# Patient Record
Sex: FEMALE | Race: WHITE | NOT HISPANIC OR LATINO | Employment: OTHER | URBAN - METROPOLITAN AREA
[De-identification: names, ages, dates, MRNs, and addresses within clinical notes are randomized per-mention and may not be internally consistent; named-entity substitution may affect disease eponyms.]

---

## 2023-02-08 PROBLEM — G47.30 MILD SLEEP APNEA: Status: ACTIVE | Noted: 2023-02-08

## 2023-02-08 PROBLEM — E55.9 VITAMIN D DEFICIENCY: Status: ACTIVE | Noted: 2023-02-08

## 2023-02-08 PROBLEM — E78.5 HYPERLIPIDEMIA: Status: ACTIVE | Noted: 2023-02-08

## 2023-02-08 PROBLEM — E11.65 TYPE 2 DIABETES MELLITUS WITH HYPERGLYCEMIA, WITHOUT LONG-TERM CURRENT USE OF INSULIN (MULTI): Status: ACTIVE | Noted: 2023-02-08

## 2023-02-08 PROBLEM — F33.1 MODERATE EPISODE OF RECURRENT MAJOR DEPRESSIVE DISORDER (MULTI): Status: ACTIVE | Noted: 2023-02-08

## 2023-02-08 PROBLEM — I83.813 VARICOSE VEINS OF BOTH LOWER EXTREMITIES WITH PAIN: Status: ACTIVE | Noted: 2023-02-08

## 2023-02-08 PROBLEM — I10 ESSENTIAL HYPERTENSION: Status: ACTIVE | Noted: 2023-02-08

## 2023-02-08 PROBLEM — R00.2 PALPITATIONS: Status: ACTIVE | Noted: 2023-02-08

## 2023-02-08 PROBLEM — F10.21 ALCOHOLISM IN RECOVERY (MULTI): Status: ACTIVE | Noted: 2023-02-08

## 2023-02-08 PROBLEM — D72.819 LEUKOPENIA: Status: ACTIVE | Noted: 2023-02-08

## 2023-02-08 PROBLEM — K21.9 GERD (GASTROESOPHAGEAL REFLUX DISEASE): Status: ACTIVE | Noted: 2023-02-08

## 2023-02-08 PROBLEM — J45.909 ASTHMA (HHS-HCC): Status: ACTIVE | Noted: 2023-02-08

## 2023-02-08 PROBLEM — K44.9 HIATAL HERNIA: Status: ACTIVE | Noted: 2023-02-08

## 2023-02-08 PROBLEM — R55 PRE-SYNCOPE: Status: ACTIVE | Noted: 2023-02-08

## 2023-02-08 PROBLEM — Z86.79 HISTORY OF PSVT (PAROXYSMAL SUPRAVENTRICULAR TACHYCARDIA): Status: ACTIVE | Noted: 2023-02-08

## 2023-02-08 PROBLEM — I49.1 ATRIAL PREMATURE COMPLEX: Status: ACTIVE | Noted: 2023-02-08

## 2023-02-08 PROBLEM — I51.7 LEFT ATRIAL DILATION: Status: ACTIVE | Noted: 2023-02-08

## 2023-02-08 PROBLEM — R94.31 ABNORMAL HOLTER MONITOR FINDING: Status: ACTIVE | Noted: 2023-02-08

## 2023-02-08 PROBLEM — L40.50 PSORIATIC ARTHRITIS (MULTI): Status: ACTIVE | Noted: 2023-02-08

## 2023-02-08 PROBLEM — F41.9 ANXIETY: Status: ACTIVE | Noted: 2023-02-08

## 2023-02-08 PROBLEM — L40.9 PSORIASIS: Status: ACTIVE | Noted: 2023-02-08

## 2023-04-10 PROBLEM — E66.3 OVERWEIGHT WITH BODY MASS INDEX (BMI) OF 28 TO 28.9 IN ADULT: Status: ACTIVE | Noted: 2023-04-10

## 2023-04-10 PROBLEM — N60.99 ATYPICAL DUCTAL HYPERPLASIA OF BREAST: Status: ACTIVE | Noted: 2023-04-10

## 2023-04-10 RX ORDER — LANCETS
EACH MISCELLANEOUS
COMMUNITY
End: 2023-11-08 | Stop reason: SDUPTHER

## 2023-04-28 DIAGNOSIS — F33.1 MAJOR DEPRESSIVE DISORDER, RECURRENT, MODERATE (MULTI): ICD-10-CM

## 2023-04-28 RX ORDER — SERTRALINE HYDROCHLORIDE 50 MG/1
TABLET, FILM COATED ORAL
Qty: 90 TABLET | Refills: 1 | Status: SHIPPED | OUTPATIENT
Start: 2023-04-28 | End: 2023-11-08 | Stop reason: SDUPTHER

## 2023-05-01 LAB
ANION GAP IN SER/PLAS: 12 MMOL/L (ref 10–20)
BASOPHILS (10*3/UL) IN BLOOD BY AUTOMATED COUNT: 0.05 X10E9/L (ref 0–0.1)
BASOPHILS/100 LEUKOCYTES IN BLOOD BY AUTOMATED COUNT: 0.9 % (ref 0–2)
CALCIUM (MG/DL) IN SER/PLAS: 9.7 MG/DL (ref 8.6–10.3)
CARBON DIOXIDE, TOTAL (MMOL/L) IN SER/PLAS: 27 MMOL/L (ref 21–32)
CHLORIDE (MMOL/L) IN SER/PLAS: 104 MMOL/L (ref 98–107)
CREATININE (MG/DL) IN SER/PLAS: 0.58 MG/DL (ref 0.5–1.05)
EOSINOPHILS (10*3/UL) IN BLOOD BY AUTOMATED COUNT: 0.2 X10E9/L (ref 0–0.7)
EOSINOPHILS/100 LEUKOCYTES IN BLOOD BY AUTOMATED COUNT: 3.6 % (ref 0–6)
ERYTHROCYTE DISTRIBUTION WIDTH (RATIO) BY AUTOMATED COUNT: 13.3 % (ref 11.5–14.5)
ERYTHROCYTE MEAN CORPUSCULAR HEMOGLOBIN CONCENTRATION (G/DL) BY AUTOMATED: 30.7 G/DL (ref 32–36)
ERYTHROCYTE MEAN CORPUSCULAR VOLUME (FL) BY AUTOMATED COUNT: 92 FL (ref 80–100)
ERYTHROCYTES (10*6/UL) IN BLOOD BY AUTOMATED COUNT: 4.32 X10E12/L (ref 4–5.2)
GFR FEMALE: >90 ML/MIN/1.73M2
GLUCOSE (MG/DL) IN SER/PLAS: 198 MG/DL (ref 74–99)
HEMATOCRIT (%) IN BLOOD BY AUTOMATED COUNT: 39.8 % (ref 36–46)
HEMOGLOBIN (G/DL) IN BLOOD: 12.2 G/DL (ref 12–16)
IMMATURE GRANULOCYTES/100 LEUKOCYTES IN BLOOD BY AUTOMATED COUNT: 0.2 % (ref 0–0.9)
LEUKOCYTES (10*3/UL) IN BLOOD BY AUTOMATED COUNT: 5.6 X10E9/L (ref 4.4–11.3)
LYMPHOCYTES (10*3/UL) IN BLOOD BY AUTOMATED COUNT: 1.28 X10E9/L (ref 1.2–4.8)
LYMPHOCYTES/100 LEUKOCYTES IN BLOOD BY AUTOMATED COUNT: 22.9 % (ref 13–44)
MONOCYTES (10*3/UL) IN BLOOD BY AUTOMATED COUNT: 0.38 X10E9/L (ref 0.1–1)
MONOCYTES/100 LEUKOCYTES IN BLOOD BY AUTOMATED COUNT: 6.8 % (ref 2–10)
NEUTROPHILS (10*3/UL) IN BLOOD BY AUTOMATED COUNT: 3.68 X10E9/L (ref 1.2–7.7)
NEUTROPHILS/100 LEUKOCYTES IN BLOOD BY AUTOMATED COUNT: 65.6 % (ref 40–80)
PLATELETS (10*3/UL) IN BLOOD AUTOMATED COUNT: 240 X10E9/L (ref 150–450)
POTASSIUM (MMOL/L) IN SER/PLAS: 3.9 MMOL/L (ref 3.5–5.3)
SODIUM (MMOL/L) IN SER/PLAS: 139 MMOL/L (ref 136–145)
UREA NITROGEN (MG/DL) IN SER/PLAS: 14 MG/DL (ref 6–23)

## 2023-05-03 ENCOUNTER — OFFICE VISIT (OUTPATIENT)
Dept: PRIMARY CARE | Facility: CLINIC | Age: 68
End: 2023-05-03
Payer: MEDICARE

## 2023-05-03 VITALS
BODY MASS INDEX: 28.99 KG/M2 | RESPIRATION RATE: 16 BRPM | HEART RATE: 72 BPM | DIASTOLIC BLOOD PRESSURE: 70 MMHG | HEIGHT: 65 IN | TEMPERATURE: 98 F | OXYGEN SATURATION: 99 % | SYSTOLIC BLOOD PRESSURE: 150 MMHG | WEIGHT: 174 LBS

## 2023-05-03 DIAGNOSIS — Z00.00 MEDICARE ANNUAL WELLNESS VISIT, SUBSEQUENT: Primary | ICD-10-CM

## 2023-05-03 DIAGNOSIS — E11.65 TYPE 2 DIABETES MELLITUS WITH HYPERGLYCEMIA, WITHOUT LONG-TERM CURRENT USE OF INSULIN (MULTI): ICD-10-CM

## 2023-05-03 DIAGNOSIS — L40.50 PSORIATIC ARTHRITIS (MULTI): ICD-10-CM

## 2023-05-03 DIAGNOSIS — E78.2 MIXED HYPERLIPIDEMIA: ICD-10-CM

## 2023-05-03 DIAGNOSIS — Z12.11 SCREENING FOR COLORECTAL CANCER: ICD-10-CM

## 2023-05-03 DIAGNOSIS — Z23 ENCOUNTER FOR IMMUNIZATION: ICD-10-CM

## 2023-05-03 DIAGNOSIS — E66.3 OVERWEIGHT WITH BODY MASS INDEX (BMI) OF 28 TO 28.9 IN ADULT: ICD-10-CM

## 2023-05-03 DIAGNOSIS — F41.9 ANXIETY: ICD-10-CM

## 2023-05-03 DIAGNOSIS — Z12.12 SCREENING FOR COLORECTAL CANCER: ICD-10-CM

## 2023-05-03 DIAGNOSIS — F10.21 ALCOHOLISM IN RECOVERY (MULTI): ICD-10-CM

## 2023-05-03 DIAGNOSIS — K21.9 GASTROESOPHAGEAL REFLUX DISEASE WITHOUT ESOPHAGITIS: ICD-10-CM

## 2023-05-03 DIAGNOSIS — E55.9 VITAMIN D DEFICIENCY: ICD-10-CM

## 2023-05-03 DIAGNOSIS — L40.9 PSORIASIS: ICD-10-CM

## 2023-05-03 DIAGNOSIS — I10 ESSENTIAL HYPERTENSION: ICD-10-CM

## 2023-05-03 DIAGNOSIS — F33.1 MODERATE EPISODE OF RECURRENT MAJOR DEPRESSIVE DISORDER (MULTI): ICD-10-CM

## 2023-05-03 DIAGNOSIS — J45.40 MODERATE PERSISTENT ASTHMA WITHOUT COMPLICATION (HHS-HCC): ICD-10-CM

## 2023-05-03 DIAGNOSIS — N60.99 ATYPICAL DUCTAL HYPERPLASIA OF BREAST: ICD-10-CM

## 2023-05-03 PROBLEM — D72.819 LEUKOPENIA: Status: RESOLVED | Noted: 2023-02-08 | Resolved: 2023-05-03

## 2023-05-03 LAB — POC HEMOGLOBIN A1C: 6.8 % (ref 4.2–6.5)

## 2023-05-03 PROCEDURE — 3008F BODY MASS INDEX DOCD: CPT | Performed by: FAMILY MEDICINE

## 2023-05-03 PROCEDURE — 83036 HEMOGLOBIN GLYCOSYLATED A1C: CPT | Performed by: FAMILY MEDICINE

## 2023-05-03 PROCEDURE — 1036F TOBACCO NON-USER: CPT | Performed by: FAMILY MEDICINE

## 2023-05-03 PROCEDURE — 3078F DIAST BP <80 MM HG: CPT | Performed by: FAMILY MEDICINE

## 2023-05-03 PROCEDURE — 1159F MED LIST DOCD IN RCRD: CPT | Performed by: FAMILY MEDICINE

## 2023-05-03 PROCEDURE — 1157F ADVNC CARE PLAN IN RCRD: CPT | Performed by: FAMILY MEDICINE

## 2023-05-03 PROCEDURE — 3077F SYST BP >= 140 MM HG: CPT | Performed by: FAMILY MEDICINE

## 2023-05-03 PROCEDURE — 1170F FXNL STATUS ASSESSED: CPT | Performed by: FAMILY MEDICINE

## 2023-05-03 PROCEDURE — G0439 PPPS, SUBSEQ VISIT: HCPCS | Performed by: FAMILY MEDICINE

## 2023-05-03 PROCEDURE — 1160F RVW MEDS BY RX/DR IN RCRD: CPT | Performed by: FAMILY MEDICINE

## 2023-05-03 PROCEDURE — 4010F ACE/ARB THERAPY RXD/TAKEN: CPT | Performed by: FAMILY MEDICINE

## 2023-05-03 RX ORDER — METFORMIN HYDROCHLORIDE 500 MG/1
500 TABLET, EXTENDED RELEASE ORAL 2 TIMES DAILY
Start: 2023-05-03 | End: 2023-07-21

## 2023-05-03 RX ORDER — CLOBETASOL PROPIONATE 0.5 MG/G
OINTMENT TOPICAL
COMMUNITY
Start: 2023-02-27 | End: 2023-05-03 | Stop reason: SDUPTHER

## 2023-05-03 RX ORDER — ALBUTEROL SULFATE 0.83 MG/ML
2.5 SOLUTION RESPIRATORY (INHALATION) EVERY 6 HOURS PRN
Qty: 75 ML
Start: 2023-05-03 | End: 2023-11-08 | Stop reason: SDUPTHER

## 2023-05-03 RX ORDER — BUDESONIDE 90 UG/1
2 AEROSOL, POWDER RESPIRATORY (INHALATION) 2 TIMES DAILY
Qty: 1 EACH
Start: 2023-05-03 | End: 2023-11-08 | Stop reason: SDUPTHER

## 2023-05-03 RX ORDER — ALBUTEROL SULFATE 90 UG/1
2 AEROSOL, METERED RESPIRATORY (INHALATION) EVERY 6 HOURS PRN
Qty: 18 G
Start: 2023-05-03 | End: 2023-11-08 | Stop reason: SDUPTHER

## 2023-05-03 RX ORDER — LISINOPRIL 20 MG/1
20 TABLET ORAL DAILY
Qty: 90 TABLET | Refills: 1 | Status: SHIPPED | OUTPATIENT
Start: 2023-05-03 | End: 2023-11-08 | Stop reason: SDUPTHER

## 2023-05-03 RX ORDER — CLOBETASOL PROPIONATE 0.5 MG/G
OINTMENT TOPICAL
Start: 2023-05-03 | End: 2023-11-08 | Stop reason: SDUPTHER

## 2023-05-03 RX ORDER — GLIPIZIDE 10 MG/1
10 TABLET ORAL 2 TIMES DAILY
Qty: 180 TABLET | Refills: 1 | Status: SHIPPED | OUTPATIENT
Start: 2023-05-03 | End: 2023-11-08 | Stop reason: SDUPTHER

## 2023-05-03 RX ORDER — IXEKIZUMAB 80 MG/ML
80 INJECTION, SOLUTION SUBCUTANEOUS
Status: SHIPPED
Start: 2023-05-03

## 2023-05-03 RX ORDER — ATORVASTATIN CALCIUM 80 MG/1
80 TABLET, FILM COATED ORAL DAILY
Start: 2023-05-03 | End: 2023-11-08 | Stop reason: SDUPTHER

## 2023-05-03 RX ORDER — ACETAMINOPHEN 500 MG
50 TABLET ORAL DAILY
Start: 2023-05-03 | End: 2023-11-08 | Stop reason: SDUPTHER

## 2023-05-03 RX ORDER — OMEPRAZOLE 20 MG/1
20 CAPSULE, DELAYED RELEASE ORAL
Start: 2023-05-03 | End: 2023-11-08 | Stop reason: SDUPTHER

## 2023-05-03 ASSESSMENT — ENCOUNTER SYMPTOMS
CONFUSION: 0
SINUS PAIN: 0
HEMATURIA: 0
FREQUENCY: 0
SINUS PRESSURE: 0
NAUSEA: 0
ADENOPATHY: 0
ABDOMINAL PAIN: 0
CHEST TIGHTNESS: 0
NUMBNESS: 0
HEADACHES: 0
POLYDIPSIA: 0
FEVER: 0
COUGH: 0
DYSURIA: 0
VOMITING: 0
DIZZINESS: 0
CHILLS: 0
DIAPHORESIS: 0
SHORTNESS OF BREATH: 0
PALPITATIONS: 0
DIARRHEA: 0
CONSTIPATION: 0
DYSPHORIC MOOD: 0
LIGHT-HEADEDNESS: 0
WHEEZING: 0
NERVOUS/ANXIOUS: 0
SORE THROAT: 0
POLYPHAGIA: 0
UNEXPECTED WEIGHT CHANGE: 0

## 2023-05-03 ASSESSMENT — ACTIVITIES OF DAILY LIVING (ADL)
DOING_HOUSEWORK: INDEPENDENT
BATHING: INDEPENDENT
GROCERY_SHOPPING: INDEPENDENT
DRESSING: INDEPENDENT
TAKING_MEDICATION: INDEPENDENT
MANAGING_FINANCES: INDEPENDENT

## 2023-05-03 ASSESSMENT — PATIENT HEALTH QUESTIONNAIRE - PHQ9
SUM OF ALL RESPONSES TO PHQ9 QUESTIONS 1 AND 2: 0
1. LITTLE INTEREST OR PLEASURE IN DOING THINGS: NOT AT ALL
2. FEELING DOWN, DEPRESSED OR HOPELESS: NOT AT ALL

## 2023-05-03 NOTE — PATIENT INSTRUCTIONS
Tonya Bravo ,    Thank you for coming in today. We at [facility] appreciate your trust in our care. If you have any questions or concerns about the care you received today, please do not hesitate to contact us at [phone].    The following instructions were discussed today:    - INCREASE lisinopril to 20 mg daily   - get blood work in 2 weeks to test kidney function   - Follow up in 6 months   - Please get blood work done 1-2 weeks prior to your next visit.   - For blood work: Nothing to eat or drink for at least 10 hours prior. Okay for water or black coffee.

## 2023-05-03 NOTE — PROGRESS NOTES
"Subjective   Reason for Visit: Tonya Bravo is an 68 y.o. female here for a Medicare Wellness visit.     Past Medical, Surgical, and Family History reviewed and updated in chart.    Reviewed all medications by prescribing practitioner or clinical pharmacist (such as prescriptions, OTCs, herbal therapies and supplements) and documented in the medical record.    routine follow up. chronic issues as per assessment and plan.         Patient Care Team:  Amanda You MD as PCP - General     Review of Systems   Constitutional:  Negative for chills, diaphoresis, fever and unexpected weight change.   HENT:  Negative for congestion, sinus pressure, sinus pain, sneezing and sore throat.    Respiratory:  Negative for cough, chest tightness, shortness of breath and wheezing.    Cardiovascular:  Negative for chest pain, palpitations and leg swelling.   Gastrointestinal:  Negative for abdominal pain, constipation, diarrhea, nausea and vomiting.   Endocrine: Negative for cold intolerance, heat intolerance, polydipsia, polyphagia and polyuria.   Genitourinary:  Negative for dysuria, frequency, hematuria and urgency.   Neurological:  Negative for dizziness, syncope, light-headedness, numbness and headaches.   Hematological:  Negative for adenopathy.   Psychiatric/Behavioral:  Negative for confusion and dysphoric mood. The patient is not nervous/anxious.        Objective   Vitals:  /70 (BP Location: Right arm, Patient Position: Sitting, BP Cuff Size: Large adult)   Pulse 72   Temp 36.7 °C (98 °F)   Resp 16   Ht 1.651 m (5' 5\")   Wt 78.9 kg (174 lb)   SpO2 99%   BMI 28.96 kg/m²       Physical Exam  Vitals and nursing note reviewed.   Constitutional:       General: She is not in acute distress.     Appearance: Normal appearance.   HENT:      Head: Normocephalic and atraumatic.      Nose: Nose normal.   Eyes:      Extraocular Movements: Extraocular movements intact.      Conjunctiva/sclera: Conjunctivae normal.      " Pupils: Pupils are equal, round, and reactive to light.   Cardiovascular:      Rate and Rhythm: Normal rate and regular rhythm.      Heart sounds: No murmur heard.     No friction rub. No gallop.   Pulmonary:      Effort: Pulmonary effort is normal.      Breath sounds: Normal breath sounds. No wheezing, rhonchi or rales.   Abdominal:      General: Bowel sounds are normal. There is no distension.      Palpations: Abdomen is soft.      Tenderness: There is no abdominal tenderness.   Musculoskeletal:         General: Normal range of motion.      Cervical back: Normal range of motion and neck supple.   Skin:     General: Skin is warm and dry.   Neurological:      General: No focal deficit present.      Mental Status: She is alert and oriented to person, place, and time.      Deep Tendon Reflexes: Reflexes normal.   Psychiatric:         Mood and Affect: Mood normal.         Behavior: Behavior normal.         Thought Content: Thought content normal.         Judgment: Judgment normal.         Assessment/Plan   Problem List Items Addressed This Visit       Alcoholism in recovery (CMS/[facility])    Overview     Has been  sober since 2006         Relevant Orders    Vitamin B12    CBC and Auto Differential    Comprehensive Metabolic Panel    TSH with reflex to Free T4 if abnormal    Follow Up In Primary Care    Anxiety    Overview     12/14/2022/ stable continue sertaline         Relevant Orders    Vitamin B12    CBC and Auto Differential    Comprehensive Metabolic Panel    TSH with reflex to Free T4 if abnormal    Follow Up In Primary Care    Atypical ductal hyperplasia of breast    Overview     12/1/2021/ saw breast surgeon in 2017. Recommended yearly MRI and yearly mammogram  by 6 months. Mammogram ordered today         Relevant Orders    Vitamin B12    CBC and Auto Differential    Comprehensive Metabolic Panel    TSH with reflex to Free T4 if abnormal    Follow Up In Primary Care    Essential hypertension    Current  Assessment & Plan     - increase lisinopril to 20 mg daily          Relevant Medications    lisinopril 20 mg tablet    Other Relevant Orders    Basic metabolic panel    Vitamin B12    CBC and Auto Differential    Comprehensive Metabolic Panel    TSH with reflex to Free T4 if abnormal    Follow Up In Primary Care    Gastroesophageal reflux disease without esophagitis    Overview     12/14/2022/ continue omeprazole         Relevant Orders    Vitamin B12    CBC and Auto Differential    Comprehensive Metabolic Panel    TSH with reflex to Free T4 if abnormal    Follow Up In Primary Care    Mixed hyperlipidemia    Overview     12/14/2022/ controlled continue atorvastatin         Relevant Orders    Vitamin B12    CBC and Auto Differential    Comprehensive Metabolic Panel    Lipid Panel    TSH with reflex to Free T4 if abnormal    Follow Up In Primary Care    Moderate episode of recurrent major depressive disorder (CMS/HCC)    Overview     12/14/2022/ stable continue sertaline         Relevant Orders    Vitamin B12    CBC and Auto Differential    Comprehensive Metabolic Panel    TSH with reflex to Free T4 if abnormal    Follow Up In Primary Care    Moderate persistent asthma without complication    Overview     12/14/2022/stable, continue pulmicort, albuterol as needed         Relevant Orders    Vitamin B12    CBC and Auto Differential    Comprehensive Metabolic Panel    TSH with reflex to Free T4 if abnormal    Follow Up In Primary Care    Overweight with body mass index (BMI) of 28 to 28.9 in adult    Overview     12/14/2022/ encouraged healthy lifestyle, including adequate exercise and high fiber, low fat and low carb diet         Relevant Orders    Vitamin B12    CBC and Auto Differential    Comprehensive Metabolic Panel    TSH with reflex to Free T4 if abnormal    Follow Up In Primary Care    Psoriasis    Overview     12/14/22022/ follows with dermatology         Relevant Orders    Vitamin B12    CBC and Auto  Differential    Comprehensive Metabolic Panel    TSH with reflex to Free T4 if abnormal    Follow Up In Primary Care    Psoriatic arthritis (CMS/[facility])    Overview     12/14/2022/ follows with dermatology         Relevant Orders    Vitamin B12    CBC and Auto Differential    Comprehensive Metabolic Panel    TSH with reflex to Free T4 if abnormal    Follow Up In Primary Care    Type 2 diabetes mellitus with hyperglycemia, without long-term current use of insulin (CMS/[facility])    Overview     12/14/2022/ HBA1C up a bit to 7.5 (7)         Relevant Medications    glipiZIDE (Glucotrol) 10 mg tablet    Other Relevant Orders    POCT glycosylated hemoglobin (Hb A1C) manually resulted (Completed)    Vitamin B12    Hemoglobin A1C    Albumin , Urine Random    CBC and Auto Differential    Comprehensive Metabolic Panel    TSH with reflex to Free T4 if abnormal    Follow Up In Primary Care    Vitamin D deficiency    Overview     12/14/2022/ continue vitamin d         Relevant Orders    Vitamin D, Total    Vitamin B12    CBC and Auto Differential    Comprehensive Metabolic Panel    TSH with reflex to Free T4 if abnormal    Follow Up In Primary Care     Other Visit Diagnoses       Medicare annual wellness visit, subsequent    -  Primary    Relevant Orders    Follow Up In Primary Care    Screening for colorectal cancer        Relevant Orders    Cologuard® colon cancer screening    Vitamin B12    CBC and Auto Differential    Comprehensive Metabolic Panel    TSH with reflex to Free T4 if abnormal    Follow Up In Primary Care

## 2023-05-04 LAB
NIL(NEG) CONTROL SPOT COUNT: NORMAL
PANEL A SPOT COUNT: 0
PANEL B SPOT COUNT: 3
POS CONTROL SPOT COUNT: NORMAL
T-SPOT. TB INTERPRETATION: NEGATIVE

## 2023-06-01 LAB — NONINV COLON CA DNA+OCC BLD SCRN STL QL: NEGATIVE

## 2023-06-24 DIAGNOSIS — I10 ESSENTIAL (PRIMARY) HYPERTENSION: ICD-10-CM

## 2023-06-26 RX ORDER — LISINOPRIL 5 MG/1
TABLET ORAL
Qty: 90 TABLET | Refills: 1 | OUTPATIENT
Start: 2023-06-26

## 2023-07-20 DIAGNOSIS — E11.65 TYPE 2 DIABETES MELLITUS WITH HYPERGLYCEMIA, WITHOUT LONG-TERM CURRENT USE OF INSULIN (MULTI): ICD-10-CM

## 2023-07-21 RX ORDER — METFORMIN HYDROCHLORIDE 500 MG/1
500 TABLET, EXTENDED RELEASE ORAL 2 TIMES DAILY
Qty: 180 TABLET | Refills: 1 | Status: SHIPPED | OUTPATIENT
Start: 2023-07-21 | End: 2023-11-08 | Stop reason: SINTOL

## 2023-10-24 DIAGNOSIS — F33.1 MAJOR DEPRESSIVE DISORDER, RECURRENT, MODERATE (MULTI): ICD-10-CM

## 2023-10-24 RX ORDER — SERTRALINE HYDROCHLORIDE 50 MG/1
TABLET, FILM COATED ORAL
Qty: 90 TABLET | Refills: 1 | OUTPATIENT
Start: 2023-10-24

## 2023-10-26 DIAGNOSIS — I10 ESSENTIAL HYPERTENSION: ICD-10-CM

## 2023-10-26 DIAGNOSIS — E11.65 TYPE 2 DIABETES MELLITUS WITH HYPERGLYCEMIA, WITHOUT LONG-TERM CURRENT USE OF INSULIN (MULTI): ICD-10-CM

## 2023-10-26 RX ORDER — GLIPIZIDE 10 MG/1
10 TABLET ORAL 2 TIMES DAILY
Qty: 180 TABLET | Refills: 1 | OUTPATIENT
Start: 2023-10-26

## 2023-10-26 RX ORDER — LISINOPRIL 20 MG/1
20 TABLET ORAL DAILY
Qty: 90 TABLET | Refills: 1 | OUTPATIENT
Start: 2023-10-26

## 2023-11-06 ENCOUNTER — LAB (OUTPATIENT)
Dept: LAB | Facility: LAB | Age: 68
End: 2023-11-06
Payer: MEDICARE

## 2023-11-06 DIAGNOSIS — F33.1 MODERATE EPISODE OF RECURRENT MAJOR DEPRESSIVE DISORDER (MULTI): ICD-10-CM

## 2023-11-06 DIAGNOSIS — F41.9 ANXIETY: ICD-10-CM

## 2023-11-06 DIAGNOSIS — Z12.11 SCREENING FOR COLORECTAL CANCER: ICD-10-CM

## 2023-11-06 DIAGNOSIS — Z12.12 SCREENING FOR COLORECTAL CANCER: ICD-10-CM

## 2023-11-06 DIAGNOSIS — I10 ESSENTIAL HYPERTENSION: ICD-10-CM

## 2023-11-06 DIAGNOSIS — K21.9 GASTROESOPHAGEAL REFLUX DISEASE WITHOUT ESOPHAGITIS: ICD-10-CM

## 2023-11-06 DIAGNOSIS — E11.65 TYPE 2 DIABETES MELLITUS WITH HYPERGLYCEMIA, WITHOUT LONG-TERM CURRENT USE OF INSULIN (MULTI): ICD-10-CM

## 2023-11-06 DIAGNOSIS — J45.40 MODERATE PERSISTENT ASTHMA WITHOUT COMPLICATION (HHS-HCC): ICD-10-CM

## 2023-11-06 DIAGNOSIS — F10.21 ALCOHOLISM IN RECOVERY (MULTI): ICD-10-CM

## 2023-11-06 DIAGNOSIS — E78.2 MIXED HYPERLIPIDEMIA: ICD-10-CM

## 2023-11-06 DIAGNOSIS — E66.3 OVERWEIGHT WITH BODY MASS INDEX (BMI) OF 28 TO 28.9 IN ADULT: ICD-10-CM

## 2023-11-06 DIAGNOSIS — N60.99 ATYPICAL DUCTAL HYPERPLASIA OF BREAST: ICD-10-CM

## 2023-11-06 DIAGNOSIS — L40.50 PSORIATIC ARTHRITIS (MULTI): ICD-10-CM

## 2023-11-06 DIAGNOSIS — L40.9 PSORIASIS: ICD-10-CM

## 2023-11-06 DIAGNOSIS — E55.9 VITAMIN D DEFICIENCY: ICD-10-CM

## 2023-11-06 LAB
25(OH)D3 SERPL-MCNC: 43 NG/ML (ref 30–100)
ALBUMIN SERPL BCP-MCNC: 4 G/DL (ref 3.4–5)
ALP SERPL-CCNC: 43 U/L (ref 33–136)
ALT SERPL W P-5'-P-CCNC: 23 U/L (ref 7–45)
ANION GAP SERPL CALC-SCNC: 10 MMOL/L (ref 10–20)
AST SERPL W P-5'-P-CCNC: 18 U/L (ref 9–39)
BASOPHILS # BLD AUTO: 0.05 X10*3/UL (ref 0–0.1)
BASOPHILS NFR BLD AUTO: 1 %
BILIRUB SERPL-MCNC: 0.9 MG/DL (ref 0–1.2)
BUN SERPL-MCNC: 13 MG/DL (ref 6–23)
CALCIUM SERPL-MCNC: 9.4 MG/DL (ref 8.6–10.3)
CHLORIDE SERPL-SCNC: 105 MMOL/L (ref 98–107)
CHOLEST SERPL-MCNC: 123 MG/DL (ref 0–199)
CHOLESTEROL/HDL RATIO: 2.2
CO2 SERPL-SCNC: 28 MMOL/L (ref 21–32)
CREAT SERPL-MCNC: 0.62 MG/DL (ref 0.5–1.05)
CREAT UR-MCNC: 93 MG/DL (ref 20–320)
EOSINOPHIL # BLD AUTO: 0.17 X10*3/UL (ref 0–0.7)
EOSINOPHIL NFR BLD AUTO: 3.3 %
ERYTHROCYTE [DISTWIDTH] IN BLOOD BY AUTOMATED COUNT: 13.2 % (ref 11.5–14.5)
EST. AVERAGE GLUCOSE BLD GHB EST-MCNC: 166 MG/DL
GFR SERPL CREATININE-BSD FRML MDRD: >90 ML/MIN/1.73M*2
GLUCOSE SERPL-MCNC: 138 MG/DL (ref 74–99)
HBA1C MFR BLD: 7.4 %
HCT VFR BLD AUTO: 38.7 % (ref 36–46)
HDLC SERPL-MCNC: 55.7 MG/DL
HGB BLD-MCNC: 12.1 G/DL (ref 12–16)
IMM GRANULOCYTES # BLD AUTO: 0.02 X10*3/UL (ref 0–0.7)
IMM GRANULOCYTES NFR BLD AUTO: 0.4 % (ref 0–0.9)
LDLC SERPL CALC-MCNC: 56 MG/DL
LYMPHOCYTES # BLD AUTO: 1.31 X10*3/UL (ref 1.2–4.8)
LYMPHOCYTES NFR BLD AUTO: 25.5 %
MCH RBC QN AUTO: 29.3 PG (ref 26–34)
MCHC RBC AUTO-ENTMCNC: 31.3 G/DL (ref 32–36)
MCV RBC AUTO: 94 FL (ref 80–100)
MICROALBUMIN UR-MCNC: <7 MG/L
MICROALBUMIN/CREAT UR: NORMAL MG/G{CREAT}
MONOCYTES # BLD AUTO: 0.43 X10*3/UL (ref 0.1–1)
MONOCYTES NFR BLD AUTO: 8.4 %
NEUTROPHILS # BLD AUTO: 3.15 X10*3/UL (ref 1.2–7.7)
NEUTROPHILS NFR BLD AUTO: 61.4 %
NON HDL CHOLESTEROL: 67 MG/DL (ref 0–149)
NRBC BLD-RTO: 0 /100 WBCS (ref 0–0)
PLATELET # BLD AUTO: 241 X10*3/UL (ref 150–450)
POTASSIUM SERPL-SCNC: 4.2 MMOL/L (ref 3.5–5.3)
PROT SERPL-MCNC: 6.7 G/DL (ref 6.4–8.2)
RBC # BLD AUTO: 4.13 X10*6/UL (ref 4–5.2)
SODIUM SERPL-SCNC: 139 MMOL/L (ref 136–145)
TRIGL SERPL-MCNC: 55 MG/DL (ref 0–149)
TSH SERPL-ACNC: 1.92 MIU/L (ref 0.44–3.98)
VIT B12 SERPL-MCNC: 190 PG/ML (ref 211–911)
VLDL: 11 MG/DL (ref 0–40)
WBC # BLD AUTO: 5.1 X10*3/UL (ref 4.4–11.3)

## 2023-11-06 PROCEDURE — 84443 ASSAY THYROID STIM HORMONE: CPT

## 2023-11-06 PROCEDURE — 82607 VITAMIN B-12: CPT

## 2023-11-06 PROCEDURE — 36415 COLL VENOUS BLD VENIPUNCTURE: CPT

## 2023-11-06 PROCEDURE — 82043 UR ALBUMIN QUANTITATIVE: CPT

## 2023-11-06 PROCEDURE — 80053 COMPREHEN METABOLIC PANEL: CPT

## 2023-11-06 PROCEDURE — 83036 HEMOGLOBIN GLYCOSYLATED A1C: CPT

## 2023-11-06 PROCEDURE — 80061 LIPID PANEL: CPT

## 2023-11-06 PROCEDURE — 82306 VITAMIN D 25 HYDROXY: CPT

## 2023-11-06 PROCEDURE — 85025 COMPLETE CBC W/AUTO DIFF WBC: CPT

## 2023-11-06 PROCEDURE — 82570 ASSAY OF URINE CREATININE: CPT

## 2023-11-08 ENCOUNTER — APPOINTMENT (OUTPATIENT)
Dept: PRIMARY CARE | Facility: CLINIC | Age: 68
End: 2023-11-08
Payer: MEDICARE

## 2023-11-08 DIAGNOSIS — L40.9 PSORIASIS: ICD-10-CM

## 2023-11-08 DIAGNOSIS — E55.9 VITAMIN D DEFICIENCY: ICD-10-CM

## 2023-11-08 DIAGNOSIS — E11.65 TYPE 2 DIABETES MELLITUS WITH HYPERGLYCEMIA, WITHOUT LONG-TERM CURRENT USE OF INSULIN (MULTI): ICD-10-CM

## 2023-11-08 DIAGNOSIS — K21.9 GASTROESOPHAGEAL REFLUX DISEASE WITHOUT ESOPHAGITIS: ICD-10-CM

## 2023-11-08 DIAGNOSIS — J45.40 MODERATE PERSISTENT ASTHMA WITHOUT COMPLICATION (HHS-HCC): ICD-10-CM

## 2023-11-08 DIAGNOSIS — E78.2 MIXED HYPERLIPIDEMIA: ICD-10-CM

## 2023-11-08 DIAGNOSIS — F33.1 MAJOR DEPRESSIVE DISORDER, RECURRENT, MODERATE (MULTI): ICD-10-CM

## 2023-11-08 DIAGNOSIS — I10 ESSENTIAL HYPERTENSION: ICD-10-CM

## 2023-11-08 RX ORDER — OMEPRAZOLE 20 MG/1
20 CAPSULE, DELAYED RELEASE ORAL
Qty: 90 CAPSULE | Refills: 1 | Status: SHIPPED | OUTPATIENT
Start: 2023-11-08 | End: 2024-05-01 | Stop reason: SDUPTHER

## 2023-11-08 RX ORDER — SERTRALINE HYDROCHLORIDE 50 MG/1
50 TABLET, FILM COATED ORAL DAILY
Qty: 90 TABLET | Refills: 1 | Status: SHIPPED | OUTPATIENT
Start: 2023-11-08 | End: 2024-05-01 | Stop reason: SDUPTHER

## 2023-11-08 RX ORDER — ALBUTEROL SULFATE 90 UG/1
2 AEROSOL, METERED RESPIRATORY (INHALATION) EVERY 6 HOURS PRN
Qty: 18 G | Refills: 1 | Status: SHIPPED | OUTPATIENT
Start: 2023-11-08

## 2023-11-08 RX ORDER — IBUPROFEN 200 MG
CAPSULE ORAL
Qty: 100 EACH | Refills: 1 | Status: SHIPPED | OUTPATIENT
Start: 2023-11-08 | End: 2023-11-27 | Stop reason: WASHOUT

## 2023-11-08 RX ORDER — GLIPIZIDE 10 MG/1
10 TABLET ORAL 2 TIMES DAILY
Qty: 180 TABLET | Refills: 1 | Status: SHIPPED | OUTPATIENT
Start: 2023-11-08 | End: 2024-05-01 | Stop reason: SDUPTHER

## 2023-11-08 RX ORDER — ACETAMINOPHEN 500 MG
2000 TABLET ORAL DAILY
Qty: 90 CAPSULE | Refills: 1 | Status: SHIPPED | OUTPATIENT
Start: 2023-11-08

## 2023-11-08 RX ORDER — ALBUTEROL SULFATE 0.83 MG/ML
2.5 SOLUTION RESPIRATORY (INHALATION) EVERY 6 HOURS PRN
Qty: 1080 ML | Refills: 1 | Status: SHIPPED | OUTPATIENT
Start: 2023-11-08 | End: 2024-05-06

## 2023-11-08 RX ORDER — LISINOPRIL 20 MG/1
20 TABLET ORAL DAILY
Qty: 90 TABLET | Refills: 1 | Status: SHIPPED | OUTPATIENT
Start: 2023-11-08 | End: 2024-05-01 | Stop reason: DRUGHIGH

## 2023-11-08 RX ORDER — LANCETS
EACH MISCELLANEOUS
Qty: 100 EACH | Refills: 1 | Status: SHIPPED | OUTPATIENT
Start: 2023-11-08 | End: 2023-11-27 | Stop reason: WASHOUT

## 2023-11-08 RX ORDER — CLOBETASOL PROPIONATE 0.5 MG/G
OINTMENT TOPICAL
Qty: 60 G | Refills: 1 | Status: SHIPPED | OUTPATIENT
Start: 2023-11-08

## 2023-11-08 RX ORDER — ATORVASTATIN CALCIUM 80 MG/1
80 TABLET, FILM COATED ORAL DAILY
Qty: 90 TABLET | Refills: 1 | Status: SHIPPED | OUTPATIENT
Start: 2023-11-08 | End: 2024-05-01 | Stop reason: SDUPTHER

## 2023-11-08 RX ORDER — BUDESONIDE 90 UG/1
2 AEROSOL, POWDER RESPIRATORY (INHALATION) 2 TIMES DAILY
Qty: 90 EACH | Refills: 1 | Status: SHIPPED | OUTPATIENT
Start: 2023-11-08 | End: 2023-11-27

## 2023-11-27 ENCOUNTER — OFFICE VISIT (OUTPATIENT)
Dept: PRIMARY CARE | Facility: CLINIC | Age: 68
End: 2023-11-27
Payer: MEDICARE

## 2023-11-27 VITALS
SYSTOLIC BLOOD PRESSURE: 136 MMHG | RESPIRATION RATE: 16 BRPM | HEART RATE: 61 BPM | HEIGHT: 65 IN | OXYGEN SATURATION: 98 % | TEMPERATURE: 97 F | WEIGHT: 172 LBS | BODY MASS INDEX: 28.66 KG/M2 | DIASTOLIC BLOOD PRESSURE: 80 MMHG

## 2023-11-27 DIAGNOSIS — K21.9 GASTROESOPHAGEAL REFLUX DISEASE WITHOUT ESOPHAGITIS: ICD-10-CM

## 2023-11-27 DIAGNOSIS — Z23 ENCOUNTER FOR IMMUNIZATION: ICD-10-CM

## 2023-11-27 DIAGNOSIS — E66.3 OVERWEIGHT WITH BODY MASS INDEX (BMI) OF 28 TO 28.9 IN ADULT: ICD-10-CM

## 2023-11-27 DIAGNOSIS — F32.1 MODERATE MAJOR DEPRESSION (MULTI): ICD-10-CM

## 2023-11-27 DIAGNOSIS — E78.2 MIXED HYPERLIPIDEMIA: ICD-10-CM

## 2023-11-27 DIAGNOSIS — M85.89 OSTEOPENIA OF MULTIPLE SITES: ICD-10-CM

## 2023-11-27 DIAGNOSIS — L40.9 PSORIASIS: ICD-10-CM

## 2023-11-27 DIAGNOSIS — F41.9 ANXIETY: ICD-10-CM

## 2023-11-27 DIAGNOSIS — E53.8 VITAMIN B12 DEFICIENCY: ICD-10-CM

## 2023-11-27 DIAGNOSIS — I10 ESSENTIAL HYPERTENSION: ICD-10-CM

## 2023-11-27 DIAGNOSIS — Z12.31 VISIT FOR SCREENING MAMMOGRAM: ICD-10-CM

## 2023-11-27 DIAGNOSIS — E11.65 TYPE 2 DIABETES MELLITUS WITH HYPERGLYCEMIA, WITHOUT LONG-TERM CURRENT USE OF INSULIN (MULTI): Primary | ICD-10-CM

## 2023-11-27 DIAGNOSIS — I49.1 ATRIAL PREMATURE COMPLEX: ICD-10-CM

## 2023-11-27 DIAGNOSIS — J45.20 MILD INTERMITTENT ASTHMA WITHOUT COMPLICATION (HHS-HCC): ICD-10-CM

## 2023-11-27 DIAGNOSIS — L40.50 PSORIATIC ARTHRITIS (MULTI): ICD-10-CM

## 2023-11-27 PROBLEM — R00.2 PALPITATIONS: Status: RESOLVED | Noted: 2023-02-08 | Resolved: 2023-11-27

## 2023-11-27 PROBLEM — R94.31 ABNORMAL HOLTER MONITOR FINDING: Status: RESOLVED | Noted: 2023-02-08 | Resolved: 2023-11-27

## 2023-11-27 PROBLEM — R55 PRE-SYNCOPE: Status: RESOLVED | Noted: 2023-02-08 | Resolved: 2023-11-27

## 2023-11-27 PROCEDURE — 3075F SYST BP GE 130 - 139MM HG: CPT | Performed by: FAMILY MEDICINE

## 2023-11-27 PROCEDURE — 3062F POS MACROALBUMINURIA REV: CPT | Performed by: FAMILY MEDICINE

## 2023-11-27 PROCEDURE — 3008F BODY MASS INDEX DOCD: CPT | Performed by: FAMILY MEDICINE

## 2023-11-27 PROCEDURE — 1160F RVW MEDS BY RX/DR IN RCRD: CPT | Performed by: FAMILY MEDICINE

## 2023-11-27 PROCEDURE — 1036F TOBACCO NON-USER: CPT | Performed by: FAMILY MEDICINE

## 2023-11-27 PROCEDURE — 3078F DIAST BP <80 MM HG: CPT | Performed by: FAMILY MEDICINE

## 2023-11-27 PROCEDURE — 3048F LDL-C <100 MG/DL: CPT | Performed by: FAMILY MEDICINE

## 2023-11-27 PROCEDURE — 1159F MED LIST DOCD IN RCRD: CPT | Performed by: FAMILY MEDICINE

## 2023-11-27 PROCEDURE — 99214 OFFICE O/P EST MOD 30 MIN: CPT | Performed by: FAMILY MEDICINE

## 2023-11-27 PROCEDURE — 3051F HG A1C>EQUAL 7.0%<8.0%: CPT | Performed by: FAMILY MEDICINE

## 2023-11-27 PROCEDURE — 4010F ACE/ARB THERAPY RXD/TAKEN: CPT | Performed by: FAMILY MEDICINE

## 2023-11-27 RX ORDER — METFORMIN HYDROCHLORIDE 500 MG/1
1000 TABLET, EXTENDED RELEASE ORAL 2 TIMES DAILY
Qty: 360 TABLET | Refills: 1 | Status: SHIPPED | OUTPATIENT
Start: 2023-11-27 | End: 2024-01-19 | Stop reason: DRUGHIGH

## 2023-11-27 RX ORDER — METFORMIN HYDROCHLORIDE 500 MG/1
1 TABLET, EXTENDED RELEASE ORAL 2 TIMES DAILY
COMMUNITY
Start: 2022-09-06 | End: 2023-11-27 | Stop reason: SDUPTHER

## 2023-11-27 RX ORDER — METFORMIN HYDROCHLORIDE 500 MG/1
500 TABLET, EXTENDED RELEASE ORAL 2 TIMES DAILY
Start: 2023-11-27 | End: 2023-11-27 | Stop reason: SDUPTHER

## 2023-11-27 RX ORDER — LANOLIN ALCOHOL/MO/W.PET/CERES
1000 CREAM (GRAM) TOPICAL DAILY
Qty: 90 TABLET | Refills: 3
Start: 2023-11-27 | End: 2024-11-26

## 2023-11-27 ASSESSMENT — ENCOUNTER SYMPTOMS
UNEXPECTED WEIGHT CHANGE: 0
DYSPHORIC MOOD: 0
ADENOPATHY: 0
SINUS PAIN: 0
DIARRHEA: 0
POLYPHAGIA: 0
NAUSEA: 0
DYSURIA: 0
FREQUENCY: 0
HEMATURIA: 0
ABDOMINAL PAIN: 0
WHEEZING: 0
CONFUSION: 0
POLYDIPSIA: 0
SHORTNESS OF BREATH: 0
CHILLS: 0
LIGHT-HEADEDNESS: 0
VOMITING: 0
NUMBNESS: 0
DIZZINESS: 0
PALPITATIONS: 0
CHEST TIGHTNESS: 0
COUGH: 0
HEADACHES: 0
DIAPHORESIS: 0
SORE THROAT: 0
CONSTIPATION: 0
FEVER: 0
SINUS PRESSURE: 0
NERVOUS/ANXIOUS: 0

## 2023-11-27 ASSESSMENT — ANXIETY QUESTIONNAIRES
6. BECOMING EASILY ANNOYED OR IRRITABLE: NOT AT ALL
3. WORRYING TOO MUCH ABOUT DIFFERENT THINGS: NOT AT ALL
IF YOU CHECKED OFF ANY PROBLEMS ON THIS QUESTIONNAIRE, HOW DIFFICULT HAVE THESE PROBLEMS MADE IT FOR YOU TO DO YOUR WORK, TAKE CARE OF THINGS AT HOME, OR GET ALONG WITH OTHER PEOPLE: NOT DIFFICULT AT ALL
4. TROUBLE RELAXING: NOT AT ALL
GAD7 TOTAL SCORE: 0
5. BEING SO RESTLESS THAT IT IS HARD TO SIT STILL: NOT AT ALL
1. FEELING NERVOUS, ANXIOUS, OR ON EDGE: NOT AT ALL
2. NOT BEING ABLE TO STOP OR CONTROL WORRYING: NOT AT ALL
7. FEELING AFRAID AS IF SOMETHING AWFUL MIGHT HAPPEN: NOT AT ALL

## 2023-11-27 NOTE — ASSESSMENT & PLAN NOTE
- HbA1c 7.4  - current regimen: glipizide 10 mg twice daily, metformin 500 mg twice daily   - her insurance does not cover SGLT-2 and GLP-1 that well (falls into donut hole and then no longer covered  - INCREASE metformin to 1000 mg twice daily

## 2023-11-27 NOTE — PATIENT INSTRUCTIONS
Tonya Bravo ,    Thank you for coming in today. We at [facility] appreciate your trust in our care. If you have any questions or concerns about the care you received today, please do not hesitate to contact us at [phone].    The following instructions were discussed today:    - I recommend the following vaccines at your pharmacy: Influenza, PCV 20 (pneumonia), COVID-19, Shingrix (shingles)  - start vitamin B12 1000 mcg daily   - INCREASE metformin to 1000 mg twice daily   - get labs in 3 months   - Follow up 5/1/2024 as scheduled.

## 2023-11-27 NOTE — PROGRESS NOTES
Subjective   Patient ID: oTnya Bravo is a 68 y.o. female who presents for lab results, refusing flu at this time (Will get flu vaccine at pharmacy due to insurance reasons).    HPI     routine follow up. chronic issues as per assessment and plan.     Labs from 11/6/23 reviewed with patient and noted below.     Lab on 11/06/2023   Component Date Value Ref Range Status    Vitamin D, 25-Hydroxy, Total 11/06/2023 43  30 - 100 ng/mL Final    Vitamin B12 11/06/2023 190 (L)  211 - 911 pg/mL Final    Hemoglobin A1C 11/06/2023 7.4 (H)  see below % Final    Estimated Average Glucose 11/06/2023 166  Not Established mg/dL Final    WBC 11/06/2023 5.1  4.4 - 11.3 x10*3/uL Final    nRBC 11/06/2023 0.0  0.0 - 0.0 /100 WBCs Final    RBC 11/06/2023 4.13  4.00 - 5.20 x10*6/uL Final    Hemoglobin 11/06/2023 12.1  12.0 - 16.0 g/dL Final    Hematocrit 11/06/2023 38.7  36.0 - 46.0 % Final    MCV 11/06/2023 94  80 - 100 fL Final    MCH 11/06/2023 29.3  26.0 - 34.0 pg Final    MCHC 11/06/2023 31.3 (L)  32.0 - 36.0 g/dL Final    RDW 11/06/2023 13.2  11.5 - 14.5 % Final    Platelets 11/06/2023 241  150 - 450 x10*3/uL Final    Neutrophils % 11/06/2023 61.4  40.0 - 80.0 % Final    Immature Granulocytes %, Automated 11/06/2023 0.4  0.0 - 0.9 % Final    Immature Granulocyte Count (IG) includes promyelocytes, myelocytes and metamyelocytes but does not include bands. Percent differential counts (%) should be interpreted in the context of the absolute cell counts (cells/UL).    Lymphocytes % 11/06/2023 25.5  13.0 - 44.0 % Final    Monocytes % 11/06/2023 8.4  2.0 - 10.0 % Final    Eosinophils % 11/06/2023 3.3  0.0 - 6.0 % Final    Basophils % 11/06/2023 1.0  0.0 - 2.0 % Final    Neutrophils Absolute 11/06/2023 3.15  1.20 - 7.70 x10*3/uL Final    Percent differential counts (%) should be interpreted in the context of the absolute cell counts (cells/uL).    Immature Granulocytes Absolute, Au* 11/06/2023 0.02  0.00 - 0.70 x10*3/uL Final     Lymphocytes Absolute 11/06/2023 1.31  1.20 - 4.80 x10*3/uL Final    Monocytes Absolute 11/06/2023 0.43  0.10 - 1.00 x10*3/uL Final    Eosinophils Absolute 11/06/2023 0.17  0.00 - 0.70 x10*3/uL Final    Basophils Absolute 11/06/2023 0.05  0.00 - 0.10 x10*3/uL Final    Glucose 11/06/2023 138 (H)  74 - 99 mg/dL Final    Sodium 11/06/2023 139  136 - 145 mmol/L Final    Potassium 11/06/2023 4.2  3.5 - 5.3 mmol/L Final    Chloride 11/06/2023 105  98 - 107 mmol/L Final    Bicarbonate 11/06/2023 28  21 - 32 mmol/L Final    Anion Gap 11/06/2023 10  10 - 20 mmol/L Final    Urea Nitrogen 11/06/2023 13  6 - 23 mg/dL Final    Creatinine 11/06/2023 0.62  0.50 - 1.05 mg/dL Final    eGFR 11/06/2023 >90  >60 mL/min/1.73m*2 Final    Calculations of estimated GFR are performed using the 2021 CKD-EPI Study Refit equation without the race variable for the IDMS-Traceable creatinine methods.  https://jasn.asnjournals.org/content/early/2021/09/22/ASN.3031028929    Calcium 11/06/2023 9.4  8.6 - 10.3 mg/dL Final    Albumin 11/06/2023 4.0  3.4 - 5.0 g/dL Final    Alkaline Phosphatase 11/06/2023 43  33 - 136 U/L Final    Total Protein 11/06/2023 6.7  6.4 - 8.2 g/dL Final    AST 11/06/2023 18  9 - 39 U/L Final    Bilirubin, Total 11/06/2023 0.9  0.0 - 1.2 mg/dL Final    ALT 11/06/2023 23  7 - 45 U/L Final    Patients treated with Sulfasalazine may generate falsely decreased results for ALT.    Cholesterol 11/06/2023 123  0 - 199 mg/dL Final          Age      Desirable   Borderline High   High     0-19 Y     0 - 169       170 - 199     >/= 200    20-24 Y     0 - 189       190 - 224     >/= 225         >24 Y     0 - 199       200 - 239     >/= 240   **All ranges are based on fasting samples. Specific   therapeutic targets will vary based on patient-specific   cardiac risk.    Pediatric guidelines reference:Pediatrics 2011, 128(S5).Adult guidelines reference: NCEP ATPIII Guidelines,HUGO 2001, 258:2486-97    Venipuncture immediately after or during  the administration of Metamizole may lead to falsely low results. Testing should be performed immediately prior to Metamizole dosing.    HDL-Cholesterol 11/06/2023 55.7  mg/dL Final      Age       Very Low   Low     Normal    High    0-19 Y    < 35      < 40     40-45     ----  20-24 Y    ----     < 40      >45      ----        >24 Y      ----     < 40     40-60      >60      Cholesterol/HDL Ratio 11/06/2023 2.2   Final      Ref Values  Desirable  < 3.4  High Risk  > 5.0    LDL Calculated 11/06/2023 56  <=99 mg/dL Final                                Near   Borderline      AGE      Desirable  Optimal    High     High     Very High     0-19 Y     0 - 109     ---    110-129   >/= 130     ----    20-24 Y     0 - 119     ---    120-159   >/= 160     ----      >24 Y     0 -  99   100-129  130-159   160-189     >/=190      VLDL 11/06/2023 11  0 - 40 mg/dL Final    Triglycerides 11/06/2023 55  0 - 149 mg/dL Final       Age         Desirable   Borderline High   High     Very High   0 D-90 D    19 - 174         ----         ----        ----  91 D- 9 Y     0 -  74        75 -  99     >/= 100      ----    10-19 Y     0 -  89        90 - 129     >/= 130      ----    20-24 Y     0 - 114       115 - 149     >/= 150      ----         >24 Y     0 - 149       150 - 199    200- 499    >/= 500    Venipuncture immediately after or during the administration of Metamizole may lead to falsely low results. Testing should be performed immediately prior to Metamizole dosing.    Non HDL Cholesterol 11/06/2023 67  0 - 149 mg/dL Final          Age       Desirable   Borderline High   High     Very High     0-19 Y     0 - 119       120 - 144     >/= 145    >/= 160    20-24 Y     0 - 149       150 - 189     >/= 190      ----         >24 Y    30 mg/dL above LDL Cholesterol goal      Thyroid Stimulating Hormone 11/06/2023 1.92  0.44 - 3.98 mIU/L Final    Albumin, Urine Random 11/06/2023 <7.0  Not established mg/L Final    Creatinine, Urine Random  "11/06/2023 93.0  20.0 - 320.0 mg/dL Final    Albumin/Creatine Ratio 11/06/2023    Final    One or more analytes used in this calculation is outside of the analytical measurement range. Calculation cannot be performed.        Review of Systems   Constitutional:  Negative for chills, diaphoresis, fever and unexpected weight change.   HENT:  Negative for congestion, sinus pressure, sinus pain, sneezing and sore throat.    Respiratory:  Negative for cough, chest tightness, shortness of breath and wheezing.    Cardiovascular:  Negative for chest pain, palpitations and leg swelling.   Gastrointestinal:  Negative for abdominal pain, constipation, diarrhea, nausea and vomiting.   Endocrine: Negative for cold intolerance, heat intolerance, polydipsia, polyphagia and polyuria.   Genitourinary:  Negative for dysuria, frequency, hematuria and urgency.   Neurological:  Negative for dizziness, syncope, light-headedness, numbness and headaches.   Hematological:  Negative for adenopathy.   Psychiatric/Behavioral:  Negative for confusion and dysphoric mood. The patient is not nervous/anxious.        Objective   /80 (BP Location: Left arm, Patient Position: Sitting, BP Cuff Size: Adult)   Pulse 61   Temp 36.1 °C (97 °F)   Resp 16   Ht 1.651 m (5' 5\")   Wt 78 kg (172 lb)   SpO2 98%   BMI 28.62 kg/m²     Physical Exam  Vitals and nursing note reviewed.   Constitutional:       General: She is not in acute distress.     Appearance: Normal appearance.   HENT:      Head: Normocephalic and atraumatic.      Nose: Nose normal.   Eyes:      Extraocular Movements: Extraocular movements intact.      Conjunctiva/sclera: Conjunctivae normal.      Pupils: Pupils are equal, round, and reactive to light.   Cardiovascular:      Rate and Rhythm: Normal rate and regular rhythm.      Heart sounds: No murmur heard.     No friction rub. No gallop.   Pulmonary:      Effort: Pulmonary effort is normal.      Breath sounds: Normal breath sounds. " No wheezing, rhonchi or rales.   Abdominal:      General: Bowel sounds are normal. There is no distension.      Palpations: Abdomen is soft.      Tenderness: There is no abdominal tenderness.   Musculoskeletal:         General: Normal range of motion.      Cervical back: Normal range of motion and neck supple.   Skin:     General: Skin is warm and dry.   Neurological:      General: No focal deficit present.      Mental Status: She is alert and oriented to person, place, and time.      Deep Tendon Reflexes: Reflexes normal.   Psychiatric:         Mood and Affect: Mood normal.         Behavior: Behavior normal.         Thought Content: Thought content normal.         Judgment: Judgment normal.         Assessment/Plan   Problem List Items Addressed This Visit             ICD-10-CM    Anxiety F41.9     - controlled. continue sertaline         Atrial premature complex I49.1     follows with cardiology         BMI 28.0-28.9,adult Z68.28     - Encouraged healthy lifestyle, including adequate exercise and high fiber, low fat and low carb diet.          Encounter for immunization Z23     - Declined flu vaccine.  Plans to get at pharmacy   - declined PCV 20. Plans to get at pharmacy          Essential hypertension I10     - controlled. Continue lisinopril         Gastroesophageal reflux disease without esophagitis K21.9     - controlled. continue omeprazole         Mild intermittent asthma without complication J45.20     - controlled. Continue albuterol as needed          Mixed hyperlipidemia E78.2     - controlled. continue atorvastatin. Recheck labs around Nov. 2024         Moderate major depression (CMS/HCC) F32.1     - controlled. continue sertaline         Osteopenia of multiple sites M85.89     - recommended calcium plus D  - weight bearing exercises  - recheck DEXA 1/2025         Overweight with body mass index (BMI) of 28 to 28.9 in adult E66.3, Z68.28     saw breast surgeon in 2017. Recommended yearly MRI and yearly  mammogram  by 6 months.          Psoriasis L40.9     - follows with dermatology          Psoriatic arthritis (CMS/[facility]) L40.50     - follows with dermatology          Type 2 diabetes mellitus with hyperglycemia, without long-term current use of insulin (CMS/[facility]) - Primary E11.65     - HbA1c 7.4  - current regimen: glipizide 10 mg twice daily, metformin 500 mg twice daily   - her insurance does not cover SGLT-2 and GLP-1 that well (falls into donut hole and then no longer covered  - INCREASE metformin to 1000 mg twice daily          Relevant Medications    metFORMIN  mg 24 hr tablet    Other Relevant Orders    Hemoglobin A1C    Vitamin B12 deficiency E53.8     - start vitamin B12 1000 mcg daily   - recheck vitamin B12 in 6 months          Relevant Medications    cyanocobalamin (Vitamin B-12) 1,000 mcg tablet    Other Relevant Orders    Vitamin B12     Other Visit Diagnoses         Codes    Visit for screening mammogram     Z12.31    Relevant Orders    BI mammo bilateral screening tomosynthesis

## 2024-01-19 ENCOUNTER — TELEMEDICINE (OUTPATIENT)
Dept: PRIMARY CARE | Facility: CLINIC | Age: 69
End: 2024-01-19
Payer: MEDICARE

## 2024-01-19 DIAGNOSIS — E11.65 TYPE 2 DIABETES MELLITUS WITH HYPERGLYCEMIA, WITHOUT LONG-TERM CURRENT USE OF INSULIN (MULTI): ICD-10-CM

## 2024-01-19 DIAGNOSIS — R11.0 NAUSEA: Primary | ICD-10-CM

## 2024-01-19 PROCEDURE — 99213 OFFICE O/P EST LOW 20 MIN: CPT | Performed by: FAMILY MEDICINE

## 2024-01-19 RX ORDER — METFORMIN HYDROCHLORIDE 500 MG/1
500 TABLET, EXTENDED RELEASE ORAL 2 TIMES DAILY
Qty: 180 TABLET | Refills: 1 | Status: SHIPPED | OUTPATIENT
Start: 2024-01-19 | End: 2024-05-01 | Stop reason: SINTOL

## 2024-01-19 ASSESSMENT — ENCOUNTER SYMPTOMS
UNEXPECTED WEIGHT CHANGE: 1
POLYDIPSIA: 0
VOMITING: 0
NUMBNESS: 0
SHORTNESS OF BREATH: 0
NERVOUS/ANXIOUS: 0
FEVER: 0
ABDOMINAL PAIN: 0
HEMATURIA: 0
CONFUSION: 0
SINUS PAIN: 0
SINUS PRESSURE: 0
FREQUENCY: 0
HEADACHES: 0
PALPITATIONS: 0
CONSTIPATION: 0
DYSURIA: 0
DIAPHORESIS: 0
NAUSEA: 1
DYSPHORIC MOOD: 0
SORE THROAT: 0
LIGHT-HEADEDNESS: 0
COUGH: 0
POLYPHAGIA: 0
DIZZINESS: 0
CHILLS: 0
WHEEZING: 0
DIARRHEA: 0
ADENOPATHY: 0
CHEST TIGHTNESS: 0

## 2024-01-19 NOTE — ASSESSMENT & PLAN NOTE
- HbA1c 7.4  - current regimen: glipizide 10 mg twice daily, metformin 1000 mg twice daily   - her insurance does not cover SGLT-2 and GLP-1 that well (falls into donut hole and then no longer covered  - has been nauseous and dry heaving since metformin increase  - decrease metformin back down to 500 mg twice daily   - Encouraged healthy lifestyle, including adequate exercise and high fiber, low fat and low carb diet.

## 2024-01-19 NOTE — ASSESSMENT & PLAN NOTE
- secondary to increase in metformin. Has improved since she decreased her dose back down to 500 mg twice daily   - if symptoms recur, will check gallbladder US and amylase and lipase

## 2024-01-19 NOTE — PROGRESS NOTES
Subjective   Patient ID: Tonya Bravo is a 68 y.o. female who presents for Nausea.    Virtual or Telephone Consent    A telephone visit (audio only) between the patient (at the originating site) and the provider (at the distant site) was utilized to provide this telehealth service.   Verbal consent was requested and obtained from Tonya Bravo on this date, 01/19/24 for a telehealth visit.      HPI  Acute visit. She is having issues with increase in her metformin dose. Has been on it for about a month. Since then, she has had periods of increased nausea and dry heaves. She has lost her appetite. Does not want to eat because she knows she will get sick. Also nothing tastes good. Has lost about 10 pounds. Her metformin was increased about 1 month ago because HbA1c was 7.4.    Review of Systems   Constitutional:  Positive for unexpected weight change. Negative for chills, diaphoresis and fever.   HENT:  Negative for congestion, sinus pressure, sinus pain, sneezing and sore throat.    Respiratory:  Negative for cough, chest tightness, shortness of breath and wheezing.    Cardiovascular:  Negative for chest pain, palpitations and leg swelling.   Gastrointestinal:  Positive for nausea. Negative for abdominal pain, constipation, diarrhea and vomiting.   Endocrine: Negative for cold intolerance, heat intolerance, polydipsia, polyphagia and polyuria.   Genitourinary:  Negative for dysuria, frequency, hematuria and urgency.   Neurological:  Negative for dizziness, syncope, light-headedness, numbness and headaches.   Hematological:  Negative for adenopathy.   Psychiatric/Behavioral:  Negative for confusion and dysphoric mood. The patient is not nervous/anxious.          Assessment/Plan   Problem List Items Addressed This Visit       Nausea - Primary     - secondary to increase in metformin. Has improved since she decreased her dose back down to 500 mg twice daily   - if symptoms recur, will check gallbladder US  and amylase and lipase          Type 2 diabetes mellitus with hyperglycemia, without long-term current use of insulin (CMS/[facility])     - HbA1c 7.4  - current regimen: glipizide 10 mg twice daily, metformin 1000 mg twice daily   - her insurance does not cover SGLT-2 and GLP-1 that well (falls into donut hole and then no longer covered  - has been nauseous and dry heaving since metformin increase  - decrease metformin back down to 500 mg twice daily   - Encouraged healthy lifestyle, including adequate exercise and high fiber, low fat and low carb diet.          Relevant Medications    metFORMIN  mg 24 hr tablet

## 2024-02-28 ENCOUNTER — APPOINTMENT (OUTPATIENT)
Dept: CARDIOLOGY | Facility: CLINIC | Age: 69
End: 2024-02-28
Payer: MEDICARE

## 2024-02-28 PROBLEM — I10 PRIMARY HYPERTENSION: Status: ACTIVE | Noted: 2022-12-12

## 2024-02-28 PROBLEM — M85.80 OSTEOPENIA: Status: ACTIVE | Noted: 2023-01-27

## 2024-02-28 PROBLEM — R69 DISEASE SUSPECTED: Status: ACTIVE | Noted: 2024-02-28

## 2024-02-28 PROBLEM — I83.90 VARICOSE VEINS OF LOWER EXTREMITY: Status: ACTIVE | Noted: 2023-02-08

## 2024-02-28 PROBLEM — F41.8 MIXED ANXIETY DEPRESSIVE DISORDER: Status: ACTIVE | Noted: 2022-12-12

## 2024-02-28 PROBLEM — E11.9 TYPE 2 DIABETES MELLITUS WITHOUT COMPLICATION (MULTI): Status: ACTIVE | Noted: 2022-12-12

## 2024-02-28 PROBLEM — F10.20 ALCOHOL DEPENDENCE (MULTI): Status: ACTIVE | Noted: 2023-02-08

## 2024-02-28 PROBLEM — R13.19 ESOPHAGEAL DYSPHAGIA: Status: ACTIVE | Noted: 2024-02-28

## 2024-02-28 PROBLEM — L29.9 ITCHING: Status: ACTIVE | Noted: 2024-02-28

## 2024-02-28 PROBLEM — R42 DIZZINESS: Status: ACTIVE | Noted: 2024-02-28

## 2024-02-28 PROBLEM — J45.909 ASTHMA (HHS-HCC): Status: ACTIVE | Noted: 2022-12-12

## 2024-02-28 PROBLEM — J18.9 RIGHT MIDDLE LOBE PNEUMONIA: Status: ACTIVE | Noted: 2024-02-28

## 2024-02-28 PROBLEM — I51.7 CARDIOMEGALY: Status: ACTIVE | Noted: 2023-03-27

## 2024-02-28 PROBLEM — R05.9 COUGH: Status: ACTIVE | Noted: 2024-02-28

## 2024-02-28 PROBLEM — F33.1 MODERATE EPISODE OF RECURRENT MAJOR DEPRESSIVE DISORDER (MULTI): Status: ACTIVE | Noted: 2022-12-12

## 2024-02-28 PROBLEM — E78.5 HYPERLIPIDEMIA: Status: ACTIVE | Noted: 2022-12-12

## 2024-02-28 PROBLEM — R53.83 FATIGUE: Status: ACTIVE | Noted: 2024-02-28

## 2024-02-28 PROBLEM — B37.31 CANDIDIASIS OF VAGINA: Status: ACTIVE | Noted: 2024-02-28

## 2024-02-28 PROBLEM — E66.9 OBESITY WITH BODY MASS INDEX 30 OR GREATER: Status: ACTIVE | Noted: 2024-02-28

## 2024-02-28 PROBLEM — J06.9 ACUTE UPPER RESPIRATORY INFECTION: Status: ACTIVE | Noted: 2024-02-28

## 2024-02-28 PROBLEM — M62.81 MUSCLE WEAKNESS: Status: ACTIVE | Noted: 2024-02-28

## 2024-02-28 RX ORDER — FAMOTIDINE 20 MG/1
20 TABLET, FILM COATED ORAL 2 TIMES DAILY
COMMUNITY
End: 2024-05-01 | Stop reason: SDUPTHER

## 2024-02-29 ENCOUNTER — OFFICE VISIT (OUTPATIENT)
Dept: CARDIOLOGY | Facility: CLINIC | Age: 69
End: 2024-02-29
Payer: MEDICARE

## 2024-02-29 VITALS
SYSTOLIC BLOOD PRESSURE: 130 MMHG | HEART RATE: 72 BPM | HEIGHT: 65 IN | DIASTOLIC BLOOD PRESSURE: 68 MMHG | BODY MASS INDEX: 27.29 KG/M2 | WEIGHT: 163.8 LBS

## 2024-02-29 DIAGNOSIS — Z86.79 HISTORY OF PAROXYSMAL SUPRAVENTRICULAR TACHYCARDIA: Primary | ICD-10-CM

## 2024-02-29 DIAGNOSIS — I10 PRIMARY HYPERTENSION: ICD-10-CM

## 2024-02-29 PROCEDURE — 1036F TOBACCO NON-USER: CPT | Performed by: INTERNAL MEDICINE

## 2024-02-29 PROCEDURE — 1160F RVW MEDS BY RX/DR IN RCRD: CPT | Performed by: INTERNAL MEDICINE

## 2024-02-29 PROCEDURE — 99214 OFFICE O/P EST MOD 30 MIN: CPT | Performed by: INTERNAL MEDICINE

## 2024-02-29 PROCEDURE — 1157F ADVNC CARE PLAN IN RCRD: CPT | Performed by: INTERNAL MEDICINE

## 2024-02-29 PROCEDURE — 93000 ELECTROCARDIOGRAM COMPLETE: CPT | Performed by: INTERNAL MEDICINE

## 2024-02-29 PROCEDURE — 3008F BODY MASS INDEX DOCD: CPT | Performed by: INTERNAL MEDICINE

## 2024-02-29 PROCEDURE — 1159F MED LIST DOCD IN RCRD: CPT | Performed by: INTERNAL MEDICINE

## 2024-02-29 PROCEDURE — 4010F ACE/ARB THERAPY RXD/TAKEN: CPT | Performed by: INTERNAL MEDICINE

## 2024-02-29 PROCEDURE — 3078F DIAST BP <80 MM HG: CPT | Performed by: INTERNAL MEDICINE

## 2024-02-29 PROCEDURE — 3075F SYST BP GE 130 - 139MM HG: CPT | Performed by: INTERNAL MEDICINE

## 2024-02-29 NOTE — PROGRESS NOTES
Chief Complaint:   No chief complaint on file.     History Of Present Illness:    Tonya Bravo is a 68 y.o. female with history of hypertension, type 2 diabetes, gastroesophageal reflux disease who is here for follow up of fluttering in the chest with heart rate up to 102-107 bpm, exhaustion, shortness of breath with activities, occasional sweating. Symptoms have improved since she retired.   She does not have chest pain with activities, orthopnea, PND, ankle swelling, or history of loss of consciousness.      She was having episodes of dizziness, at least twice a week, without any other associated symptoms like palpitations or rapid heart rate. Not passing out. She checked her blood sugar blood pressure and heart rate and she is zeroed down finally to low blood sugar levels. . We arranged for a 7-day monitor that did not show any arrhythmia other than occasional PACs or PVCs. She also did not have any dizzy spells at that time.     Family history is pertinent for mother with atrial fibrillation, father with coronary artery disease in his 70s.     She had an echo done on February 19, 2020 showing normal LV function but stage I diastolic dysfunction and significantly enlarged left atrium.  Echo from 2023 however stated normal left atrial size.  I personally reviewed both echocardiograms and I believe that the left atrial size has been underestimated in the echo from 2023 the truth is in the middle her left atrium is moderately enlarged.     Holter monitor done on February 20, 2020 showed few SVTs during which she remained asymptomatic. There were 12 RUNS of SVT, longest in 4 beats at 146 bpm. Several PACs were noted, with 5% burden.      She also had a stress test on March 12, 2020 where she was able to exercise for 4-1/2 minutes achieving 7 metabolic equivalents with borderline EKG changes and shortness of breath.  We did another stress test with imaging which was normal.     Her EKG today in office shows  "sinus rhythm,No PACs noted this time .     Last Recorded Vitals:  Vitals:    02/29/24 1035   BP: 130/68   Pulse: 72   Weight: 74.3 kg (163 lb 12.8 oz)   Height: 1.651 m (5' 5\")       Past Medical History:  She has a past medical history of Abnormal result of other cardiovascular function study (04/02/2020), Conjunctivitis (10/2023), Diaphragmatic hernia without obstruction or gangrene (12/14/2017), Disorder of bone, unspecified (08/02/2021), Encounter for other screening for malignant neoplasm of breast (11/03/2017), Gastro-esophageal reflux disease with esophagitis, without bleeding (11/08/2017), Long term (current) use of selective estrogen receptor modulators (serms) (11/03/2017), Nipple discharge (05/04/2017), Nipple discharge (07/13/2017), Other chest pain (03/24/2020), Other specified personal risk factors, not elsewhere classified (12/16/2019), Personal history of other diseases of the musculoskeletal system and connective tissue (06/06/2019), Personal history of other diseases of the respiratory system (12/07/2016), Personal history of other diseases of the respiratory system (12/07/2016), Personal history of other infectious and parasitic diseases (06/01/2020), Personal history of other specified conditions (12/07/2016), Personal history of other specified conditions (06/01/2020), Personal history of other specified conditions (05/04/2017), Personal history of other specified conditions, Unspecified benign mammary dysplasia of unspecified breast (12/01/2021), and Unspecified benign mammary dysplasia of unspecified breast (12/01/2021).    Past Surgical History:  She has a past surgical history that includes Breast surgery (08/19/2017); Breast biopsy (05/04/2017); Hernia repair (05/04/2017); Other surgical history (05/04/2017); US chest ECHO (2023); and XR BONE density (2023).      Social History:  She reports that she has never smoked. She has never used smokeless tobacco. She reports that she does not " currently use alcohol. She reports that she does not use drugs.    Family History:  Family History   Problem Relation Name Age of Onset    Atrial fibrillation Mother          chronic    Other (cardiac pacemaker) Mother      Coronary artery disease Father      Ovarian cancer Mother's Sister      Pancreatic cancer Paternal Grandmother          Allergies:  Patient has no known allergies.    Outpatient Medications:  Current Outpatient Medications   Medication Instructions    albuterol 90 mcg/actuation inhaler 2 puffs, inhalation, Every 6 hours PRN    albuterol 2.5 mg, nebulization, Every 6 hours PRN    aspirin 81 mg capsule 1 tablet, oral, Daily    atorvastatin (LIPITOR) 80 mg, oral, Daily    cholecalciferol (VITAMIN D-3) 2,000 Units, oral, Daily    clobetasol (Temovate) 0.05 % ointment APPLY A THIN LAYER TO AFFECTED AREAS TWICE DAILY    cyanocobalamin (VITAMIN B-12) 1,000 mcg, oral, Daily    empagliflozin (JARDIANCE) 25 mg, oral, Daily    famotidine (PEPCID) 20 mg, oral, 2 times daily    glipiZIDE (GLUCOTROL) 10 mg, oral, 2 times daily    lisinopril 20 mg, oral, Daily    metFORMIN XR (GLUCOPHAGE-XR) 500 mg, oral, 2 times daily    omeprazole (PRILOSEC) 20 mg, oral, Daily before breakfast    sertraline (ZOLOFT) 50 mg, oral, Daily, as directed    Taltz Autoinjector 80 mg, subcutaneous, Every 28 days       Physical Exam:  Physical Exam  Vitals reviewed.   Constitutional:       Appearance: Normal appearance.   Neck:      Vascular: No carotid bruit or JVD.   Cardiovascular:      Rate and Rhythm: Normal rate and regular rhythm.      Pulses: Normal pulses.      Heart sounds: Normal heart sounds, S1 normal and S2 normal.   Pulmonary:      Effort: Pulmonary effort is normal. No respiratory distress.      Breath sounds: No wheezing or rales.   Abdominal:      General: Abdomen is flat.      Palpations: Abdomen is soft.   Musculoskeletal:      Right lower leg: No edema.      Left lower leg: No edema.   Skin:     General: Skin is  "warm.   Neurological:      Mental Status: She is alert and oriented to person, place, and time. Mental status is at baseline.   Psychiatric:         Mood and Affect: Mood normal.         Behavior: Behavior normal.           Last Labs:  CBC -  Lab Results   Component Value Date    WBC 5.1 11/06/2023    HGB 12.1 11/06/2023    HCT 38.7 11/06/2023    MCV 94 11/06/2023     11/06/2023       CMP -  Lab Results   Component Value Date    CALCIUM 9.4 11/06/2023    PROT 6.7 11/06/2023    ALBUMIN 4.0 11/06/2023    AST 18 11/06/2023    ALT 23 11/06/2023    ALKPHOS 43 11/06/2023    BILITOT 0.9 11/06/2023       LIPID PANEL -   Lab Results   Component Value Date    CHOL 123 11/06/2023    TRIG 55 11/06/2023    HDL 55.7 11/06/2023    CHHDL 2.2 11/06/2023    LDLF 62 12/12/2022    VLDL 11 11/06/2023    NHDL 67 11/06/2023       RENAL FUNCTION PANEL -   Lab Results   Component Value Date    GLUCOSE 138 (H) 11/06/2023     11/06/2023    K 4.2 11/06/2023     11/06/2023    CO2 28 11/06/2023    ANIONGAP 10 11/06/2023    BUN 13 11/06/2023    CREATININE 0.62 11/06/2023    CALCIUM 9.4 11/06/2023    ALBUMIN 4.0 11/06/2023        Lab Results   Component Value Date    HGBA1C 7.4 (H) 11/06/2023    HGBA1C 6.8 (A) 05/03/2023       Last Cardiology Tests:  ECG:  No results found for this or any previous visit from the past 1095 days.      Echo:  No results found for this or any previous visit from the past 1095 days.      Ejection Fractions:  No results found for: \"EF\"    Cath:  No results found for this or any previous visit from the past 1095 days.      Stress Test:  No results found for this or any previous visit from the past 1095 days.      Cardiac Imaging:  No results found for this or any previous visit from the past 1095 days.          Assessment/Plan     In summary Ms. Bravo is a 68-year-old lady with borderline abnormal stress test, history of palpitations and rapid heartbeat.     [id number] that her current " symptoms are due to short lasting SVTs or PACs. However with enlarged left atrial size, HTN, DM, she remains at risk for atrial fibrillation. Fortunately she has a device that is technologically advanced enough to detect atrial fibrillation. I advised her to use her Apple watch if the fast heart beat lasts several minutes specially with rates above 120 bpm. I also advised her to notify us if this happens in future, we would be analyzing the data from her watch.  The repeat 24-hour monitor showed a slightly high burden of PACs from 2023 about 7% no significant PVCs and LV function was normal.    2-borderline abnormal stress test-repeat stress test was normal. All her symptoms have subsided.     3-primary prevention-due to history of diabetes I have taken the liberty of intensifying her statins.  Target blood pressure less than 130/80.  Well-controlled at present.          Comfort Yi MD

## 2024-03-27 ENCOUNTER — LAB (OUTPATIENT)
Dept: LAB | Facility: LAB | Age: 69
End: 2024-03-27
Payer: MEDICARE

## 2024-03-27 DIAGNOSIS — E11.65 TYPE 2 DIABETES MELLITUS WITH HYPERGLYCEMIA, WITHOUT LONG-TERM CURRENT USE OF INSULIN (MULTI): ICD-10-CM

## 2024-03-27 DIAGNOSIS — L40.0 PSORIASIS VULGARIS: Primary | ICD-10-CM

## 2024-03-27 DIAGNOSIS — E53.8 VITAMIN B12 DEFICIENCY: ICD-10-CM

## 2024-03-27 DIAGNOSIS — L40.0 PSORIASIS VULGARIS: ICD-10-CM

## 2024-03-27 DIAGNOSIS — I10 ESSENTIAL HYPERTENSION: ICD-10-CM

## 2024-03-27 LAB
ANION GAP SERPL CALC-SCNC: 13 MMOL/L (ref 10–20)
BUN SERPL-MCNC: 14 MG/DL (ref 6–23)
CALCIUM SERPL-MCNC: 9.6 MG/DL (ref 8.6–10.3)
CHLORIDE SERPL-SCNC: 102 MMOL/L (ref 98–107)
CO2 SERPL-SCNC: 27 MMOL/L (ref 21–32)
CREAT SERPL-MCNC: 0.56 MG/DL (ref 0.5–1.05)
EGFRCR SERPLBLD CKD-EPI 2021: >90 ML/MIN/1.73M*2
EST. AVERAGE GLUCOSE BLD GHB EST-MCNC: 160 MG/DL
GLUCOSE SERPL-MCNC: 234 MG/DL (ref 74–99)
HBA1C MFR BLD: 7.2 %
POTASSIUM SERPL-SCNC: 4 MMOL/L (ref 3.5–5.3)
SODIUM SERPL-SCNC: 138 MMOL/L (ref 136–145)
VIT B12 SERPL-MCNC: 220 PG/ML (ref 211–911)

## 2024-03-27 PROCEDURE — 36415 COLL VENOUS BLD VENIPUNCTURE: CPT

## 2024-03-27 PROCEDURE — 82607 VITAMIN B-12: CPT

## 2024-03-27 PROCEDURE — 86481 TB AG RESPONSE T-CELL SUSP: CPT

## 2024-03-27 PROCEDURE — 83036 HEMOGLOBIN GLYCOSYLATED A1C: CPT

## 2024-03-27 PROCEDURE — 80048 BASIC METABOLIC PNL TOTAL CA: CPT

## 2024-03-29 LAB
NIL(NEG) CONTROL SPOT COUNT: NORMAL
PANEL A SPOT COUNT: 0
PANEL B SPOT COUNT: 0
POS CONTROL SPOT COUNT: NORMAL
T-SPOT. TB INTERPRETATION: NEGATIVE

## 2024-04-28 DIAGNOSIS — E11.65 TYPE 2 DIABETES MELLITUS WITH HYPERGLYCEMIA, WITHOUT LONG-TERM CURRENT USE OF INSULIN (MULTI): ICD-10-CM

## 2024-04-28 DIAGNOSIS — F33.1 MAJOR DEPRESSIVE DISORDER, RECURRENT, MODERATE (MULTI): ICD-10-CM

## 2024-04-28 DIAGNOSIS — I10 ESSENTIAL HYPERTENSION: ICD-10-CM

## 2024-04-28 DIAGNOSIS — K21.9 GASTROESOPHAGEAL REFLUX DISEASE WITHOUT ESOPHAGITIS: ICD-10-CM

## 2024-04-28 DIAGNOSIS — E78.2 MIXED HYPERLIPIDEMIA: ICD-10-CM

## 2024-05-01 ENCOUNTER — OFFICE VISIT (OUTPATIENT)
Dept: PRIMARY CARE | Facility: CLINIC | Age: 69
End: 2024-05-01
Payer: MEDICARE

## 2024-05-01 VITALS
SYSTOLIC BLOOD PRESSURE: 150 MMHG | DIASTOLIC BLOOD PRESSURE: 70 MMHG | HEART RATE: 65 BPM | OXYGEN SATURATION: 98 % | BODY MASS INDEX: 26.82 KG/M2 | WEIGHT: 161 LBS | RESPIRATION RATE: 16 BRPM | HEIGHT: 65 IN

## 2024-05-01 DIAGNOSIS — L40.50 PSORIASIS WITH ARTHROPATHY (MULTI): ICD-10-CM

## 2024-05-01 DIAGNOSIS — E53.8 COBALAMIN DEFICIENCY: ICD-10-CM

## 2024-05-01 DIAGNOSIS — Z23 ENCOUNTER FOR IMMUNIZATION: ICD-10-CM

## 2024-05-01 DIAGNOSIS — I10 ESSENTIAL HYPERTENSION: ICD-10-CM

## 2024-05-01 DIAGNOSIS — Z00.00 MEDICARE ANNUAL WELLNESS VISIT, SUBSEQUENT: Primary | ICD-10-CM

## 2024-05-01 DIAGNOSIS — M85.80 OSTEOPENIA, UNSPECIFIED LOCATION: ICD-10-CM

## 2024-05-01 DIAGNOSIS — F10.21 ALCOHOL DEPENDENCE IN REMISSION (MULTI): ICD-10-CM

## 2024-05-01 DIAGNOSIS — E55.9 VITAMIN D DEFICIENCY: ICD-10-CM

## 2024-05-01 DIAGNOSIS — K21.9 GASTROESOPHAGEAL REFLUX DISEASE WITHOUT ESOPHAGITIS: ICD-10-CM

## 2024-05-01 DIAGNOSIS — E11.65 TYPE 2 DIABETES MELLITUS WITH HYPERGLYCEMIA, WITHOUT LONG-TERM CURRENT USE OF INSULIN (MULTI): ICD-10-CM

## 2024-05-01 DIAGNOSIS — I49.1 ATRIAL PREMATURE DEPOLARIZATION: ICD-10-CM

## 2024-05-01 DIAGNOSIS — E66.3 OVERWEIGHT WITH BODY MASS INDEX (BMI) OF 26 TO 26.9 IN ADULT: ICD-10-CM

## 2024-05-01 DIAGNOSIS — L40.9 PSORIASIS: ICD-10-CM

## 2024-05-01 DIAGNOSIS — E78.2 MIXED HYPERLIPIDEMIA: ICD-10-CM

## 2024-05-01 DIAGNOSIS — J45.20 MILD INTERMITTENT ASTHMA WITHOUT COMPLICATION (HHS-HCC): ICD-10-CM

## 2024-05-01 DIAGNOSIS — F32.1 MODERATE MAJOR DEPRESSION (MULTI): ICD-10-CM

## 2024-05-01 DIAGNOSIS — Z12.31 VISIT FOR SCREENING MAMMOGRAM: ICD-10-CM

## 2024-05-01 DIAGNOSIS — F41.9 ANXIETY: ICD-10-CM

## 2024-05-01 PROBLEM — R53.83 FATIGUE: Status: RESOLVED | Noted: 2024-02-28 | Resolved: 2024-05-01

## 2024-05-01 PROBLEM — R05.9 COUGH: Status: RESOLVED | Noted: 2024-02-28 | Resolved: 2024-05-01

## 2024-05-01 PROBLEM — L29.9 ITCHING: Status: RESOLVED | Noted: 2024-02-28 | Resolved: 2024-05-01

## 2024-05-01 PROBLEM — M62.81 MUSCLE WEAKNESS: Status: RESOLVED | Noted: 2024-02-28 | Resolved: 2024-05-01

## 2024-05-01 PROBLEM — R69 DISEASE SUSPECTED: Status: RESOLVED | Noted: 2024-02-28 | Resolved: 2024-05-01

## 2024-05-01 PROBLEM — J06.9 ACUTE UPPER RESPIRATORY INFECTION: Status: RESOLVED | Noted: 2024-02-28 | Resolved: 2024-05-01

## 2024-05-01 PROBLEM — R11.0 NAUSEA: Status: RESOLVED | Noted: 2024-01-19 | Resolved: 2024-05-01

## 2024-05-01 PROBLEM — R13.19 ESOPHAGEAL DYSPHAGIA: Status: RESOLVED | Noted: 2024-02-28 | Resolved: 2024-05-01

## 2024-05-01 PROBLEM — B37.31 CANDIDIASIS OF VAGINA: Status: RESOLVED | Noted: 2024-02-28 | Resolved: 2024-05-01

## 2024-05-01 PROBLEM — R42 DIZZINESS: Status: RESOLVED | Noted: 2024-02-28 | Resolved: 2024-05-01

## 2024-05-01 PROBLEM — J18.9 RIGHT MIDDLE LOBE PNEUMONIA: Status: RESOLVED | Noted: 2024-02-28 | Resolved: 2024-05-01

## 2024-05-01 PROCEDURE — G0439 PPPS, SUBSEQ VISIT: HCPCS | Performed by: FAMILY MEDICINE

## 2024-05-01 PROCEDURE — 1170F FXNL STATUS ASSESSED: CPT | Performed by: FAMILY MEDICINE

## 2024-05-01 PROCEDURE — 3078F DIAST BP <80 MM HG: CPT | Performed by: FAMILY MEDICINE

## 2024-05-01 PROCEDURE — 1036F TOBACCO NON-USER: CPT | Performed by: FAMILY MEDICINE

## 2024-05-01 PROCEDURE — 1157F ADVNC CARE PLAN IN RCRD: CPT | Performed by: FAMILY MEDICINE

## 2024-05-01 PROCEDURE — 3051F HG A1C>EQUAL 7.0%<8.0%: CPT | Performed by: FAMILY MEDICINE

## 2024-05-01 PROCEDURE — 1158F ADVNC CARE PLAN TLK DOCD: CPT | Performed by: FAMILY MEDICINE

## 2024-05-01 PROCEDURE — 3077F SYST BP >= 140 MM HG: CPT | Performed by: FAMILY MEDICINE

## 2024-05-01 PROCEDURE — 99214 OFFICE O/P EST MOD 30 MIN: CPT | Performed by: FAMILY MEDICINE

## 2024-05-01 PROCEDURE — 1123F ACP DISCUSS/DSCN MKR DOCD: CPT | Performed by: FAMILY MEDICINE

## 2024-05-01 PROCEDURE — 4010F ACE/ARB THERAPY RXD/TAKEN: CPT | Performed by: FAMILY MEDICINE

## 2024-05-01 PROCEDURE — 1159F MED LIST DOCD IN RCRD: CPT | Performed by: FAMILY MEDICINE

## 2024-05-01 PROCEDURE — 3008F BODY MASS INDEX DOCD: CPT | Performed by: FAMILY MEDICINE

## 2024-05-01 PROCEDURE — 1160F RVW MEDS BY RX/DR IN RCRD: CPT | Performed by: FAMILY MEDICINE

## 2024-05-01 RX ORDER — SERTRALINE HYDROCHLORIDE 50 MG/1
50 TABLET, FILM COATED ORAL DAILY
Qty: 90 TABLET | Refills: 1 | Status: SHIPPED | OUTPATIENT
Start: 2024-05-01 | End: 2024-10-28

## 2024-05-01 RX ORDER — LISINOPRIL 40 MG/1
40 TABLET ORAL DAILY
Qty: 90 TABLET | Refills: 1 | Status: SHIPPED | OUTPATIENT
Start: 2024-05-01 | End: 2024-10-28

## 2024-05-01 RX ORDER — LISINOPRIL 20 MG/1
20 TABLET ORAL DAILY
Qty: 90 TABLET | Refills: 1 | Status: CANCELLED | OUTPATIENT
Start: 2024-05-01 | End: 2024-10-28

## 2024-05-01 RX ORDER — FAMOTIDINE 20 MG/1
20 TABLET, FILM COATED ORAL 2 TIMES DAILY
Qty: 180 TABLET | Refills: 1 | Status: SHIPPED | OUTPATIENT
Start: 2024-05-01 | End: 2024-05-01 | Stop reason: WASHOUT

## 2024-05-01 RX ORDER — ATORVASTATIN CALCIUM 80 MG/1
80 TABLET, FILM COATED ORAL DAILY
Qty: 90 TABLET | Refills: 1 | Status: SHIPPED | OUTPATIENT
Start: 2024-05-01

## 2024-05-01 RX ORDER — GLIPIZIDE 10 MG/1
10 TABLET ORAL 2 TIMES DAILY
Qty: 180 TABLET | Refills: 1 | OUTPATIENT
Start: 2024-05-01

## 2024-05-01 RX ORDER — OMEPRAZOLE 20 MG/1
20 CAPSULE, DELAYED RELEASE ORAL
Qty: 90 CAPSULE | Refills: 1 | Status: SHIPPED | OUTPATIENT
Start: 2024-05-01

## 2024-05-01 RX ORDER — IBUPROFEN 200 MG
CAPSULE ORAL
Qty: 100 STRIP | Refills: 3 | Status: SHIPPED | OUTPATIENT
Start: 2024-05-01

## 2024-05-01 RX ORDER — LANCETS
EACH MISCELLANEOUS
Qty: 100 EACH | Refills: 3 | Status: SHIPPED | OUTPATIENT
Start: 2024-05-01

## 2024-05-01 RX ORDER — DEXTROSE 4 G
TABLET,CHEWABLE ORAL
Qty: 1 EACH | Refills: 0 | Status: SHIPPED | OUTPATIENT
Start: 2024-05-01

## 2024-05-01 RX ORDER — GLIPIZIDE 10 MG/1
10 TABLET ORAL 2 TIMES DAILY
Qty: 180 TABLET | Refills: 1 | Status: CANCELLED | OUTPATIENT
Start: 2024-05-01

## 2024-05-01 RX ORDER — GLIPIZIDE 10 MG/1
10 TABLET ORAL 2 TIMES DAILY
Qty: 180 TABLET | Refills: 1 | Status: SHIPPED | OUTPATIENT
Start: 2024-05-01

## 2024-05-01 ASSESSMENT — ENCOUNTER SYMPTOMS
CONFUSION: 0
DYSURIA: 0
SINUS PRESSURE: 0
HEMATURIA: 0
SINUS PAIN: 0
NERVOUS/ANXIOUS: 0
CHEST TIGHTNESS: 0
DIAPHORESIS: 0
NAUSEA: 0
HEADACHES: 0
POLYPHAGIA: 0
NUMBNESS: 0
ADENOPATHY: 0
COUGH: 0
DIARRHEA: 0
UNEXPECTED WEIGHT CHANGE: 0
VOMITING: 0
POLYDIPSIA: 0
LIGHT-HEADEDNESS: 0
FREQUENCY: 0
FEVER: 0
DYSPHORIC MOOD: 0
ABDOMINAL PAIN: 0
SHORTNESS OF BREATH: 0
DIZZINESS: 0
CHILLS: 0
WHEEZING: 0
SORE THROAT: 0
PALPITATIONS: 0
CONSTIPATION: 0

## 2024-05-01 ASSESSMENT — PATIENT HEALTH QUESTIONNAIRE - PHQ9
5. POOR APPETITE OR OVEREATING: SEVERAL DAYS
SUM OF ALL RESPONSES TO PHQ9 QUESTIONS 1 & 2: 2
3. TROUBLE FALLING OR STAYING ASLEEP: NOT AT ALL
4. FEELING TIRED OR HAVING LITTLE ENERGY: SEVERAL DAYS
6. FEELING BAD ABOUT YOURSELF - OR THAT YOU ARE A FAILURE OR HAVE LET YOURSELF OR YOUR FAMILY DOWN: SEVERAL DAYS
1. LITTLE INTEREST OR PLEASURE IN DOING THINGS: SEVERAL DAYS
2. FEELING DOWN, DEPRESSED OR HOPELESS: SEVERAL DAYS
7. TROUBLE CONCENTRATING ON THINGS, SUCH AS READING THE NEWSPAPER OR WATCHING TELEVISION: NOT AT ALL
8. MOVING OR SPEAKING SO SLOWLY THAT OTHER PEOPLE COULD HAVE NOTICED. OR THE OPPOSITE, BEING SO FIGETY OR RESTLESS THAT YOU HAVE BEEN MOVING AROUND A LOT MORE THAN USUAL: NOT AT ALL
SUM OF ALL RESPONSES TO PHQ QUESTIONS 1-9: 5
9. THOUGHTS THAT YOU WOULD BE BETTER OFF DEAD, OR OF HURTING YOURSELF: NOT AT ALL
10. IF YOU CHECKED OFF ANY PROBLEMS, HOW DIFFICULT HAVE THESE PROBLEMS MADE IT FOR YOU TO DO YOUR WORK, TAKE CARE OF THINGS AT HOME, OR GET ALONG WITH OTHER PEOPLE: NOT DIFFICULT AT ALL

## 2024-05-01 ASSESSMENT — ANXIETY QUESTIONNAIRES
3. WORRYING TOO MUCH ABOUT DIFFERENT THINGS: NOT AT ALL
6. BECOMING EASILY ANNOYED OR IRRITABLE: NOT AT ALL
4. TROUBLE RELAXING: NOT AT ALL
1. FEELING NERVOUS, ANXIOUS, OR ON EDGE: NOT AT ALL
2. NOT BEING ABLE TO STOP OR CONTROL WORRYING: NOT AT ALL
5. BEING SO RESTLESS THAT IT IS HARD TO SIT STILL: NOT AT ALL
IF YOU CHECKED OFF ANY PROBLEMS ON THIS QUESTIONNAIRE, HOW DIFFICULT HAVE THESE PROBLEMS MADE IT FOR YOU TO DO YOUR WORK, TAKE CARE OF THINGS AT HOME, OR GET ALONG WITH OTHER PEOPLE: NOT DIFFICULT AT ALL
7. FEELING AFRAID AS IF SOMETHING AWFUL MIGHT HAPPEN: NOT AT ALL
GAD7 TOTAL SCORE: 0

## 2024-05-01 ASSESSMENT — ACTIVITIES OF DAILY LIVING (ADL)
DRESSING: INDEPENDENT
BATHING: INDEPENDENT
DOING_HOUSEWORK: INDEPENDENT
TAKING_MEDICATION: INDEPENDENT
GROCERY_SHOPPING: INDEPENDENT
MANAGING_FINANCES: INDEPENDENT

## 2024-05-01 NOTE — PROGRESS NOTES
Subjective   Reason for Visit: Tonya Bravo is an 69 y.o. female here for a Medicare Wellness visit.     Past Medical, Surgical, and Family History reviewed and updated in chart.    Reviewed all medications by prescribing practitioner or clinical pharmacist (such as prescriptions, OTCs, herbal therapies and supplements) and documented in the medical record.    HPI  routine follow up. chronic issues as per assessment and plan.     Saw cardiology. Dr. Yi, on 2/29/24. Note reviewed. This was for palpitations. No changes made in regards to that.    Concerned about her GI symptoms    Lab on 03/27/2024   Component Date Value Ref Range Status    Glucose 03/27/2024 234 (H)  74 - 99 mg/dL Final    Sodium 03/27/2024 138  136 - 145 mmol/L Final    Potassium 03/27/2024 4.0  3.5 - 5.3 mmol/L Final    Chloride 03/27/2024 102  98 - 107 mmol/L Final    Bicarbonate 03/27/2024 27  21 - 32 mmol/L Final    Anion Gap 03/27/2024 13  10 - 20 mmol/L Final    Urea Nitrogen 03/27/2024 14  6 - 23 mg/dL Final    Creatinine 03/27/2024 0.56  0.50 - 1.05 mg/dL Final    eGFR 03/27/2024 >90  >60 mL/min/1.73m*2 Final    Calculations of estimated GFR are performed using the 2021 CKD-EPI Study Refit equation without the race variable for the IDMS-Traceable creatinine methods.  https://jasn.asnjournals.org/content/early/2021/09/22/ASN.7023532435    Calcium 03/27/2024 9.6  8.6 - 10.3 mg/dL Final    Vitamin B12 03/27/2024 220  211 - 911 pg/mL Final    Hemoglobin A1C 03/27/2024 7.2 (H)  see below % Final    Estimated Average Glucose 03/27/2024 160  Not Established mg/dL Final    T-SPOT. TB Interpretation 03/27/2024 Negative  Negative Final       A negative test result does not exclude the possibility  of exposure to or infection with Mycobacterium  tuberculosis (M. tuberculosis). Patients with recent  exposure to TB infected individuals exhibiting a  negative T-SPOT.TB result should be considered for  retesting within 6 weeks or if other  relevant clinical  symptoms indicate. Results from T-SPOT.TB testing must  be used in conjunction with each individual's  epidemiological history, current medical status,  and results of other diagnostic evaluations.            The T-SPOT.TB test is qualitative and results are  reported as positive, borderline, or negative, given  that the test controls perform as expected. In line  with the Centers for Disease Control and Prevention's  2010 recommendation to report quantitative measurements  alongside the qualitative result, the laboratory  provides spot counts for informational purposes only.  The T-SPOT.TB test should not be interpreted as a  quantitative test.       Panel A Spot Count 03/27/2024 0   Final    Panel B Spot Count 03/27/2024 0   Final    NIL(NEG) Control Spot Count 03/27/2024 Passed   Final    POS Control Spot Count 03/27/2024 Passed   Final       For additional information, please refer to  http://education.[employer]/faq/TVE794     (This link is being provided for informational/  educational purposes only.)               Patient Care Team:  Amanda You MD as PCP - General  Yony Vigil PA-C as PCP - [facility] Attributed Provider  Comfort Yi MD as Consulting Physician (Cardiology)     Review of Systems   Constitutional:  Negative for chills, diaphoresis, fever and unexpected weight change.   HENT:  Negative for congestion, sinus pressure, sinus pain, sneezing and sore throat.    Respiratory:  Negative for cough, chest tightness, shortness of breath and wheezing.    Cardiovascular:  Negative for chest pain, palpitations and leg swelling.   Gastrointestinal:  Negative for abdominal pain, constipation, diarrhea, nausea and vomiting.   Endocrine: Negative for cold intolerance, heat intolerance, polydipsia, polyphagia and polyuria.   Genitourinary:  Negative for dysuria, frequency, hematuria and urgency.   Neurological:  Negative for dizziness, syncope, light-headedness, numbness  "and headaches.   Hematological:  Negative for adenopathy.   Psychiatric/Behavioral:  Negative for confusion and dysphoric mood. The patient is not nervous/anxious.        Objective   Vitals:  /70 (BP Location: Left arm, Patient Position: Sitting, BP Cuff Size: Large adult)   Pulse 65   Resp 16   Ht 1.651 m (5' 5\")   Wt 73 kg (161 lb)   SpO2 98%   BMI 26.79 kg/m²       Physical Exam  Vitals and nursing note reviewed.   Constitutional:       General: She is not in acute distress.     Appearance: Normal appearance.   HENT:      Head: Normocephalic and atraumatic.      Nose: Nose normal.   Eyes:      Extraocular Movements: Extraocular movements intact.      Conjunctiva/sclera: Conjunctivae normal.      Pupils: Pupils are equal, round, and reactive to light.   Cardiovascular:      Rate and Rhythm: Normal rate and regular rhythm.      Heart sounds: No murmur heard.     No friction rub. No gallop.   Pulmonary:      Effort: Pulmonary effort is normal.      Breath sounds: Normal breath sounds. No wheezing, rhonchi or rales.   Abdominal:      General: Bowel sounds are normal. There is no distension.      Palpations: Abdomen is soft.      Tenderness: There is no abdominal tenderness.   Musculoskeletal:         General: Normal range of motion.      Cervical back: Normal range of motion and neck supple.   Skin:     General: Skin is warm and dry.   Neurological:      General: No focal deficit present.      Mental Status: She is alert and oriented to person, place, and time.      Deep Tendon Reflexes: Reflexes normal.   Psychiatric:         Mood and Affect: Mood normal.         Behavior: Behavior normal.         Thought Content: Thought content normal.         Judgment: Judgment normal.         Assessment/Plan   Problem List Items Addressed This Visit       Alcohol dependence (Multi)    Overview     Has been  sober since 2006  Comment on above: has been sober since 2006;         Current Assessment & Plan     - sober " since 2026         Anxiety    Current Assessment & Plan     - controlled. continue sertaline         Relevant Medications    sertraline (Zoloft) 50 mg tablet    Atrial premature depolarization    Current Assessment & Plan     follows with cardiology         BMI 26.0-26.9,adult    Current Assessment & Plan     - Encouraged healthy lifestyle, including adequate exercise and high fiber, low fat and low carb diet.          Cobalamin deficiency    Current Assessment & Plan     - continue vitamin B12         Encounter for immunization    Current Assessment & Plan     - PCV 20  - recommended the following vaccines at the pharmacy: Shingrix, RSV         Essential hypertension    Current Assessment & Plan     - blood pressure elevated  - current regimen: lisinopril 20 mg daily   - INCREASE lisinopril to 40 mg daily          Relevant Medications    lisinopril 40 mg tablet    Other Relevant Orders    Basic metabolic panel    Gastroesophageal reflux disease without esophagitis    Current Assessment & Plan     - controlled. continue omeprazole         Relevant Medications    omeprazole (PriLOSEC) 20 mg DR capsule    Mild intermittent asthma without complication ([facility])    Current Assessment & Plan     - controlled. Continue albuterol as needed          Mixed hyperlipidemia    Current Assessment & Plan     - continue atorvastatin. This was recently increased by Dr. Yi  - recheck lipids in 6 months          Relevant Medications    atorvastatin (Lipitor) 80 mg tablet    Moderate major depression (Multi)    Current Assessment & Plan     - controlled. continue sertaline         Relevant Medications    sertraline (Zoloft) 50 mg tablet    Osteopenia    Current Assessment & Plan     - recommended calcium plus D  - weight bearing exercises  - recheck DEXA 1/2025         Overweight with body mass index (BMI) of 26 to 26.9 in adult    Current Assessment & Plan     saw breast surgeon in 2017. Recommended yearly MRI and yearly  mammogram  by 6 months.          Psoriasis    Current Assessment & Plan     - follows with dermatology          Psoriasis with arthropathy (Multi)    Current Assessment & Plan     - follows with dermatology          Type 2 diabetes mellitus with hyperglycemia, without long-term current use of insulin (Multi)    Current Assessment & Plan     - HbA1c improved to 7.2 (7.4)  - current regimen: glipizide 10 mg twice daily, metformin 1000 mg twice daily   - had decreased her dose of metformin due to side effects (GI upset) but went back up to 1000 mg twice daily over concerns of elevated blood sugars  - STOP metformin due to GI side effects   - start jardiance 10 mg daily for one month and then increase to 25 mg daily              Relevant Medications    blood sugar diagnostic (Blood Glucose Test) strip    lancets misc    blood-glucose meter misc    empagliflozin (Jardiance) 10 mg    empagliflozin (Jardiance) 25 mg (Start on 5/29/2024)    glipiZIDE (Glucotrol) 10 mg tablet    Vitamin D deficiency    Current Assessment & Plan     continue vitamin d          Other Visit Diagnoses       Medicare annual wellness visit, subsequent    -  Primary    Visit for screening mammogram        Relevant Orders    BI mammo bilateral screening tomosynthesis

## 2024-05-01 NOTE — PATIENT INSTRUCTIONS
Tonya Bravo ,    Thank you for coming in today. We at [facility] appreciate your trust in our care. If you have any questions or concerns about the care you received today, please do not hesitate to contact us at [phone].    The following instructions were discussed today:    - STOP metformin   - start jardiance 10 mg daily for one month and then increase to 25 mg daily   - INCREASE lisinopril to 40 mg daily   - get blood work in 1-2 weeks after increasing the lisinopril   - get mammogram   - I recommend the following vaccines at the pharmacy: Shingrix, RSV  - Follow up in 3 months

## 2024-05-01 NOTE — ASSESSMENT & PLAN NOTE
- blood pressure elevated  - current regimen: lisinopril 20 mg daily   - INCREASE lisinopril to 40 mg daily

## 2024-05-01 NOTE — ASSESSMENT & PLAN NOTE
- HbA1c improved to 7.2 (7.4)  - current regimen: glipizide 10 mg twice daily, metformin 1000 mg twice daily   - had decreased her dose of metformin due to side effects (GI upset) but went back up to 1000 mg twice daily over concerns of elevated blood sugars  - STOP metformin due to GI side effects   - start jardiance 10 mg daily for one month and then increase to 25 mg daily

## 2024-05-03 RX ORDER — SERTRALINE HYDROCHLORIDE 50 MG/1
50 TABLET, FILM COATED ORAL DAILY
Qty: 90 TABLET | Refills: 1 | OUTPATIENT
Start: 2024-05-03

## 2024-05-03 RX ORDER — LISINOPRIL 20 MG/1
20 TABLET ORAL DAILY
Qty: 90 TABLET | Refills: 1 | OUTPATIENT
Start: 2024-05-03

## 2024-05-03 RX ORDER — ATORVASTATIN CALCIUM 80 MG/1
80 TABLET, FILM COATED ORAL DAILY
Qty: 90 TABLET | Refills: 1 | OUTPATIENT
Start: 2024-05-03

## 2024-05-03 RX ORDER — OMEPRAZOLE 20 MG/1
CAPSULE, DELAYED RELEASE ORAL
Qty: 90 CAPSULE | Refills: 1 | OUTPATIENT
Start: 2024-05-03

## 2024-05-23 ENCOUNTER — TELEPHONE (OUTPATIENT)
Dept: PRIMARY CARE | Facility: CLINIC | Age: 69
End: 2024-05-23
Payer: MEDICARE

## 2024-05-23 DIAGNOSIS — E11.65 TYPE 2 DIABETES MELLITUS WITH HYPERGLYCEMIA, WITHOUT LONG-TERM CURRENT USE OF INSULIN (MULTI): ICD-10-CM

## 2024-05-25 DIAGNOSIS — E11.65 TYPE 2 DIABETES MELLITUS WITH HYPERGLYCEMIA, WITHOUT LONG-TERM CURRENT USE OF INSULIN (MULTI): ICD-10-CM

## 2024-05-28 RX ORDER — METFORMIN HYDROCHLORIDE 500 MG/1
1000 TABLET, EXTENDED RELEASE ORAL 2 TIMES DAILY
Qty: 360 TABLET | Refills: 1 | OUTPATIENT
Start: 2024-05-28

## 2024-08-09 ENCOUNTER — LAB (OUTPATIENT)
Dept: LAB | Facility: LAB | Age: 69
End: 2024-08-09
Payer: MEDICARE

## 2024-08-09 DIAGNOSIS — I10 ESSENTIAL HYPERTENSION: ICD-10-CM

## 2024-08-09 LAB
ANION GAP SERPL CALC-SCNC: 11 MMOL/L (ref 10–20)
BUN SERPL-MCNC: 13 MG/DL (ref 6–23)
CALCIUM SERPL-MCNC: 9.7 MG/DL (ref 8.6–10.3)
CHLORIDE SERPL-SCNC: 103 MMOL/L (ref 98–107)
CO2 SERPL-SCNC: 29 MMOL/L (ref 21–32)
CREAT SERPL-MCNC: 0.66 MG/DL (ref 0.5–1.05)
EGFRCR SERPLBLD CKD-EPI 2021: >90 ML/MIN/1.73M*2
GLUCOSE SERPL-MCNC: 108 MG/DL (ref 74–99)
POTASSIUM SERPL-SCNC: 4 MMOL/L (ref 3.5–5.3)
SODIUM SERPL-SCNC: 139 MMOL/L (ref 136–145)

## 2024-08-09 PROCEDURE — 80048 BASIC METABOLIC PNL TOTAL CA: CPT

## 2024-08-09 PROCEDURE — 36415 COLL VENOUS BLD VENIPUNCTURE: CPT

## 2024-08-12 ENCOUNTER — APPOINTMENT (OUTPATIENT)
Dept: PRIMARY CARE | Facility: CLINIC | Age: 69
End: 2024-08-12
Payer: MEDICARE

## 2024-08-12 VITALS
DIASTOLIC BLOOD PRESSURE: 60 MMHG | BODY MASS INDEX: 25.49 KG/M2 | HEIGHT: 65 IN | RESPIRATION RATE: 16 BRPM | HEART RATE: 60 BPM | OXYGEN SATURATION: 98 % | SYSTOLIC BLOOD PRESSURE: 122 MMHG | WEIGHT: 153 LBS

## 2024-08-12 DIAGNOSIS — F32.1 MODERATE MAJOR DEPRESSION (MULTI): ICD-10-CM

## 2024-08-12 DIAGNOSIS — I83.12 VARICOSE VEINS OF BOTH LOWER EXTREMITIES WITH INFLAMMATION: ICD-10-CM

## 2024-08-12 DIAGNOSIS — L40.50 PSORIASIS WITH ARTHROPATHY (MULTI): ICD-10-CM

## 2024-08-12 DIAGNOSIS — J45.20 MILD INTERMITTENT ASTHMA WITHOUT COMPLICATION (HHS-HCC): ICD-10-CM

## 2024-08-12 DIAGNOSIS — I83.11 VARICOSE VEINS OF BOTH LOWER EXTREMITIES WITH INFLAMMATION: ICD-10-CM

## 2024-08-12 DIAGNOSIS — E11.65 TYPE 2 DIABETES MELLITUS WITH HYPERGLYCEMIA, WITHOUT LONG-TERM CURRENT USE OF INSULIN (MULTI): Primary | ICD-10-CM

## 2024-08-12 DIAGNOSIS — Z11.59 NEED FOR HEPATITIS C SCREENING TEST: ICD-10-CM

## 2024-08-12 DIAGNOSIS — F10.21 ALCOHOL DEPENDENCE IN REMISSION (MULTI): ICD-10-CM

## 2024-08-12 DIAGNOSIS — I10 ESSENTIAL HYPERTENSION: ICD-10-CM

## 2024-08-12 DIAGNOSIS — E55.9 VITAMIN D DEFICIENCY: ICD-10-CM

## 2024-08-12 DIAGNOSIS — E53.8 COBALAMIN DEFICIENCY: ICD-10-CM

## 2024-08-12 DIAGNOSIS — E78.2 MIXED HYPERLIPIDEMIA: ICD-10-CM

## 2024-08-12 DIAGNOSIS — M85.80 OSTEOPENIA, UNSPECIFIED LOCATION: ICD-10-CM

## 2024-08-12 DIAGNOSIS — L40.9 PSORIASIS: ICD-10-CM

## 2024-08-12 DIAGNOSIS — F41.9 ANXIETY: ICD-10-CM

## 2024-08-12 DIAGNOSIS — Z23 ENCOUNTER FOR IMMUNIZATION: ICD-10-CM

## 2024-08-12 DIAGNOSIS — I49.1 ATRIAL PREMATURE DEPOLARIZATION: ICD-10-CM

## 2024-08-12 DIAGNOSIS — E66.3 OVERWEIGHT WITH BODY MASS INDEX (BMI) OF 25 TO 25.9 IN ADULT: ICD-10-CM

## 2024-08-12 LAB — POC HEMOGLOBIN A1C: 6.9 % (ref 4.2–6.5)

## 2024-08-12 PROCEDURE — 1160F RVW MEDS BY RX/DR IN RCRD: CPT | Performed by: FAMILY MEDICINE

## 2024-08-12 PROCEDURE — 1159F MED LIST DOCD IN RCRD: CPT | Performed by: FAMILY MEDICINE

## 2024-08-12 PROCEDURE — 3051F HG A1C>EQUAL 7.0%<8.0%: CPT | Performed by: FAMILY MEDICINE

## 2024-08-12 PROCEDURE — 3078F DIAST BP <80 MM HG: CPT | Performed by: FAMILY MEDICINE

## 2024-08-12 PROCEDURE — 1036F TOBACCO NON-USER: CPT | Performed by: FAMILY MEDICINE

## 2024-08-12 PROCEDURE — 83036 HEMOGLOBIN GLYCOSYLATED A1C: CPT | Performed by: FAMILY MEDICINE

## 2024-08-12 PROCEDURE — 3008F BODY MASS INDEX DOCD: CPT | Performed by: FAMILY MEDICINE

## 2024-08-12 PROCEDURE — 99214 OFFICE O/P EST MOD 30 MIN: CPT | Performed by: FAMILY MEDICINE

## 2024-08-12 PROCEDURE — 4010F ACE/ARB THERAPY RXD/TAKEN: CPT | Performed by: FAMILY MEDICINE

## 2024-08-12 PROCEDURE — 1157F ADVNC CARE PLAN IN RCRD: CPT | Performed by: FAMILY MEDICINE

## 2024-08-12 PROCEDURE — 3074F SYST BP LT 130 MM HG: CPT | Performed by: FAMILY MEDICINE

## 2024-08-12 PROCEDURE — 1123F ACP DISCUSS/DSCN MKR DOCD: CPT | Performed by: FAMILY MEDICINE

## 2024-08-12 ASSESSMENT — ENCOUNTER SYMPTOMS
PALPITATIONS: 0
WHEEZING: 0
FREQUENCY: 0
DIZZINESS: 0
VOMITING: 0
CHEST TIGHTNESS: 0
CONFUSION: 0
NUMBNESS: 0
HEADACHES: 0
NERVOUS/ANXIOUS: 0
UNEXPECTED WEIGHT CHANGE: 0
SINUS PAIN: 0
LIGHT-HEADEDNESS: 0
HEMATURIA: 0
ADENOPATHY: 0
COUGH: 0
DIARRHEA: 0
FEVER: 0
ABDOMINAL PAIN: 0
SINUS PRESSURE: 0
POLYPHAGIA: 0
SHORTNESS OF BREATH: 0
SORE THROAT: 0
CHILLS: 0
DIAPHORESIS: 0
POLYDIPSIA: 0
CONSTIPATION: 0
DYSPHORIC MOOD: 0
DYSURIA: 0
NAUSEA: 0

## 2024-08-12 ASSESSMENT — PATIENT HEALTH QUESTIONNAIRE - PHQ9
2. FEELING DOWN, DEPRESSED OR HOPELESS: NOT AT ALL
1. LITTLE INTEREST OR PLEASURE IN DOING THINGS: NOT AT ALL
SUM OF ALL RESPONSES TO PHQ9 QUESTIONS 1 AND 2: 0

## 2024-08-12 NOTE — ASSESSMENT & PLAN NOTE
- HbA1c improved to 6.9 (7.2) (7.4)  - current regimen: glipizide 10 mg twice daily, jardiance 25 mg daily

## 2024-08-12 NOTE — PATIENT INSTRUCTIONS
Tonya Bravo ,    Thank you for coming in today. We at [facility] appreciate your trust in our care. If you have any questions or concerns about the care you received today, please do not hesitate to contact us at [phone].    The following instructions were discussed today:    - Follow up in 4 months.    - Please get blood work done 1-2 weeks prior to your next visit. For blood work: Nothing to eat or drink for at least 10 hours prior. Okay for water or black coffee.   - I recommend the following vaccines at the pharmacy: Shingrix, RSV, COVID-19

## 2024-08-12 NOTE — PROGRESS NOTES
"Subjective   Patient ID: Tonya Bravo is a 69 y.o. female who presents for lab results, A1C to be done in office (Diabetes follow up/Veins in right leg discussion).    [facility]   routine follow up. chronic issues as per assessment and plan.     She has varicose veins. States a few months ago, got phlebitis. Used heat/ ice and went away.     Review of Systems   Constitutional:  Negative for chills, diaphoresis, fever and unexpected weight change.   HENT:  Negative for congestion, sinus pressure, sinus pain, sneezing and sore throat.    Respiratory:  Negative for cough, chest tightness, shortness of breath and wheezing.    Cardiovascular:  Negative for chest pain, palpitations and leg swelling.   Gastrointestinal:  Negative for abdominal pain, constipation, diarrhea, nausea and vomiting.   Endocrine: Negative for cold intolerance, heat intolerance, polydipsia, polyphagia and polyuria.   Genitourinary:  Negative for dysuria, frequency, hematuria and urgency.   Neurological:  Negative for dizziness, syncope, light-headedness, numbness and headaches.   Hematological:  Negative for adenopathy.   Psychiatric/Behavioral:  Negative for confusion and dysphoric mood. The patient is not nervous/anxious.        Objective   /60 (BP Location: Right arm, Patient Position: Sitting, BP Cuff Size: Adult)   Pulse 60   Resp 16   Ht 1.651 m (5' 5\")   Wt 69.4 kg (153 lb)   SpO2 98%   BMI 25.46 kg/m²     Physical Exam  Vitals and nursing note reviewed.   Constitutional:       General: She is not in acute distress.     Appearance: Normal appearance.   HENT:      Head: Normocephalic and atraumatic.      Nose: Nose normal.   Eyes:      Extraocular Movements: Extraocular movements intact.      Conjunctiva/sclera: Conjunctivae normal.      Pupils: Pupils are equal, round, and reactive to light.   Cardiovascular:      Rate and Rhythm: Normal rate and regular rhythm.      Heart sounds: No murmur heard.     No friction rub. No " gallop.   Pulmonary:      Effort: Pulmonary effort is normal.      Breath sounds: Normal breath sounds. No wheezing, rhonchi or rales.   Abdominal:      General: Bowel sounds are normal. There is no distension.      Palpations: Abdomen is soft.      Tenderness: There is no abdominal tenderness.   Musculoskeletal:         General: Normal range of motion.      Cervical back: Normal range of motion and neck supple.   Skin:     General: Skin is warm and dry.   Neurological:      General: No focal deficit present.      Mental Status: She is alert and oriented to person, place, and time.      Deep Tendon Reflexes: Reflexes normal.   Psychiatric:         Mood and Affect: Mood normal.         Behavior: Behavior normal.         Thought Content: Thought content normal.         Judgment: Judgment normal.         Assessment/Plan   Problem List Items Addressed This Visit             ICD-10-CM    Alcohol dependence (Multi) F10.20     - sober since 2026         Anxiety F41.9     - controlled. continue sertaline         Atrial premature depolarization I49.1     follows with cardiology         BMI 25.0-25.9,adult Z68.25     - Encouraged healthy lifestyle, including adequate exercise and high fiber, low fat and low carb diet.          Cobalamin deficiency E53.8     - continue vitamin B12         Relevant Orders    Vitamin B12    Encounter for immunization Z23     - PCV 20 declined   - recommended the following vaccines at the pharmacy: Shingrix, RSV, COVID-19         Essential hypertension I10     - controlled.   - current regimen: lisinopril 40 mg daily          Relevant Orders    Comprehensive Metabolic Panel    TSH with reflex to Free T4 if abnormal    CBC and Auto Differential    Mild intermittent asthma without complication ([facility]-HCC) J45.20     - controlled. Continue albuterol as needed          Mixed hyperlipidemia E78.2     - continue atorvastatin         Relevant Orders    Comprehensive Metabolic Panel    Lipid Panel    TSH  with reflex to Free T4 if abnormal    CBC and Auto Differential    Moderate major depression (Multi) F32.1     - controlled. continue sertaline         Osteopenia M85.80     - recommended calcium plus D  - weight bearing exercises  - recheck DEXA 1/2025         Overweight with body mass index (BMI) of 25 to 25.9 in adult E66.3, Z68.25     - Encouraged healthy lifestyle, including adequate exercise and high fiber, low fat and low carb diet.          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     - follows with dermatology          Type 2 diabetes mellitus with hyperglycemia, without long-term current use of insulin (Multi) - Primary E11.65     - HbA1c improved to 6.9 (7.2) (7.4)  - current regimen: glipizide 10 mg twice daily, jardiance 25 mg daily                Relevant Orders    POCT glycosylated hemoglobin (Hb A1C) manually resulted (Completed)    Hemoglobin A1C    Comprehensive Metabolic Panel    TSH with reflex to Free T4 if abnormal    CBC and Auto Differential    Albumin-Creatinine Ratio, Urine Random    Varicose veins of both lower extremities with inflammation I83.11, I83.12     - refer to vascular         Relevant Orders    Referral to Vascular Surgery    Vitamin D deficiency E55.9    Relevant Orders    Vitamin D 25-Hydroxy,Total (for eval of Vitamin D levels)     Other Visit Diagnoses         Codes    Need for hepatitis C screening test     Z11.59    Relevant Orders    Hepatitis C Antibody

## 2024-09-04 ENCOUNTER — OFFICE VISIT (OUTPATIENT)
Dept: VASCULAR SURGERY | Facility: HOSPITAL | Age: 69
End: 2024-09-04
Payer: MEDICARE

## 2024-09-04 VITALS
WEIGHT: 156 LBS | DIASTOLIC BLOOD PRESSURE: 72 MMHG | BODY MASS INDEX: 25.99 KG/M2 | SYSTOLIC BLOOD PRESSURE: 135 MMHG | HEIGHT: 65 IN | HEART RATE: 56 BPM

## 2024-09-04 DIAGNOSIS — I83.11 VARICOSE VEINS OF BOTH LOWER EXTREMITIES WITH INFLAMMATION: ICD-10-CM

## 2024-09-04 DIAGNOSIS — I83.12 VARICOSE VEINS OF BOTH LOWER EXTREMITIES WITH INFLAMMATION: ICD-10-CM

## 2024-09-04 PROCEDURE — 1159F MED LIST DOCD IN RCRD: CPT | Performed by: PHYSICIAN ASSISTANT

## 2024-09-04 PROCEDURE — 99214 OFFICE O/P EST MOD 30 MIN: CPT | Performed by: PHYSICIAN ASSISTANT

## 2024-09-04 PROCEDURE — 1157F ADVNC CARE PLAN IN RCRD: CPT | Performed by: PHYSICIAN ASSISTANT

## 2024-09-04 PROCEDURE — 4010F ACE/ARB THERAPY RXD/TAKEN: CPT | Performed by: PHYSICIAN ASSISTANT

## 2024-09-04 PROCEDURE — 1036F TOBACCO NON-USER: CPT | Performed by: PHYSICIAN ASSISTANT

## 2024-09-04 PROCEDURE — 3078F DIAST BP <80 MM HG: CPT | Performed by: PHYSICIAN ASSISTANT

## 2024-09-04 PROCEDURE — 3075F SYST BP GE 130 - 139MM HG: CPT | Performed by: PHYSICIAN ASSISTANT

## 2024-09-04 PROCEDURE — 3051F HG A1C>EQUAL 7.0%<8.0%: CPT | Performed by: PHYSICIAN ASSISTANT

## 2024-09-04 PROCEDURE — 3008F BODY MASS INDEX DOCD: CPT | Performed by: PHYSICIAN ASSISTANT

## 2024-09-04 PROCEDURE — 99204 OFFICE O/P NEW MOD 45 MIN: CPT | Performed by: PHYSICIAN ASSISTANT

## 2024-09-04 PROCEDURE — 1123F ACP DISCUSS/DSCN MKR DOCD: CPT | Performed by: PHYSICIAN ASSISTANT

## 2024-09-04 ASSESSMENT — ENCOUNTER SYMPTOMS
ADENOPATHY: 0
SHORTNESS OF BREATH: 0
CHILLS: 0
COUGH: 0
ARTHRALGIAS: 0
NECK PAIN: 0
SLEEP DISTURBANCE: 0
SPEECH DIFFICULTY: 0
CONFUSION: 0
NAUSEA: 0
NUMBNESS: 0
DIARRHEA: 0
WEAKNESS: 0
JOINT SWELLING: 0
FACIAL ASYMMETRY: 0
WOUND: 0
COLOR CHANGE: 0
WHEEZING: 0
FATIGUE: 0
LIGHT-HEADEDNESS: 0
FEVER: 0
ABDOMINAL PAIN: 0
VOMITING: 0
DIFFICULTY URINATING: 0
SORE THROAT: 0
CONSTIPATION: 0
TROUBLE SWALLOWING: 0
DIZZINESS: 0
HEADACHES: 0
PALPITATIONS: 0
SEIZURES: 0

## 2024-09-04 NOTE — PROGRESS NOTES
Subjective   Patient ID: Tonya Bravo is a 69 y.o. female who presents for Varicose Veins.  HPI  69 year old female with past medical history of Htn, HLD, asthma, osteopenia, psoriasis, NIDDM presents as a new patient for evaluation of RLE varicose veins. Prior Dr Oviedo patient ~2017ish. History of bilateral GSV ablation for bilateral lower extremity VV and swelling. Symptoms improved post ablation. Has been symptom free until recently. Had a bough of superficial phlebitis on the right medial thigh July 2024, resolved with heat and NSAIDs after 2 weeks. No associated swelling, continues to be itchy with rope like VV in that area. Does not wear compression. Ambulatory. Active. No claudication or rest pain no wounds or ulcerations.     Past Medical History:   Diagnosis Date    Abnormal result of other cardiovascular function study 04/02/2020    Abnormal stress test    Conjunctivitis 10/2023    Diaphragmatic hernia without obstruction or gangrene 12/14/2017    Hernia, paraesophageal    Disorder of bone, unspecified 08/02/2021    Disorder of bone and cartilage    Encounter for other screening for malignant neoplasm of breast 11/03/2017    Breast cancer screening, high risk patient    Gastro-esophageal reflux disease with esophagitis, without bleeding 11/08/2017    Reflux esophagitis    Long term (current) use of selective estrogen receptor modulators (serms) 11/03/2017    Prophylactic use of raloxifene ([place])    Nipple discharge 05/04/2017    Discharge from left nipple    Nipple discharge 07/13/2017    Bloody discharge from left nipple    Other chest pain 03/24/2020    Atypical chest pain    Other specified personal risk factors, not elsewhere classified 12/16/2019    History of anxiety state    Personal history of other diseases of the musculoskeletal system and connective tissue 06/06/2019    History of muscle weakness    Personal history of other diseases of the respiratory system 12/07/2016    History of  acute bronchitis    Personal history of other diseases of the respiratory system 12/07/2016    History of acute sinusitis    Personal history of other infectious and parasitic diseases 06/01/2020    History of candidiasis of vagina    Personal history of other specified conditions 12/07/2016    History of wheezing    Personal history of other specified conditions 06/01/2020    History of fatigue    Personal history of other specified conditions 05/04/2017    History of itching    Personal history of other specified conditions     History of dizziness    Unspecified benign mammary dysplasia of unspecified breast 12/01/2021    Atypical ductal hyperplasia, breast    Unspecified benign mammary dysplasia of unspecified breast 12/01/2021    Atypical ductal hyperplasia, breast     Past Surgical History:   Procedure Laterality Date    BONE DENSITY  2023    BREAST BIOPSY  05/04/2017    Biopsy Breast Open    BREAST SURGERY  08/19/2017    Breast Surgery    HERNIA REPAIR  05/04/2017    Hernia Repair    OTHER SURGICAL HISTORY  05/04/2017    Marsupialization Of Pancreatic Cyst    RETINAL DETACHMENT SURGERY Left 2024     CHEST ECHO  2023      Social History     Tobacco Use    Smoking status: Never    Smokeless tobacco: Never   Substance Use Topics    Alcohol use: Not Currently     Comment: Sober for 18 years      Family History   Problem Relation Name Age of Onset    Atrial fibrillation Mother Krupa Valencia         chronic    Other (cardiac pacemaker) Mother Krupa Valencia     Mental illness Mother Krupa Valencia     Coronary artery disease Father      Ovarian cancer Mother's Sister      Pancreatic cancer Paternal Grandmother        Allergies   Allergen Reactions    Metformin GI Upset     Gastrointestinal upset      Current Outpatient Medications on File Prior to Visit   Medication Sig Dispense Refill    albuterol 90 mcg/actuation inhaler Inhale 2 puffs every 6 hours if needed for wheezing or shortness of breath. 18 g 1     aspirin 81 mg capsule Take 1 tablet by mouth in the morning.      atorvastatin (Lipitor) 80 mg tablet Take 1 tablet (80 mg) by mouth once daily. 90 tablet 1    blood sugar diagnostic (Blood Glucose Test) strip Check blood sugars once daily 100 strip 3    blood-glucose meter misc Check  blood sugar as needed 1 each 0    cholecalciferol (Vitamin D-3) 50 mcg (2,000 unit) capsule Take 1 capsule (2,000 Units) by mouth once daily. 90 capsule 1    clobetasol (Temovate) 0.05 % ointment APPLY A THIN LAYER TO AFFECTED AREAS TWICE DAILY 60 g 1    cyanocobalamin (Vitamin B-12) 1,000 mcg tablet Take 1 tablet (1,000 mcg) by mouth once daily. 90 tablet 3    empagliflozin (Jardiance) 25 mg Take 1 tablet (25 mg) by mouth once daily. Do not fill before May 29, 2024. 90 tablet 1    glipiZIDE (Glucotrol) 10 mg tablet Take 1 tablet (10 mg) by mouth 2 times a day. 180 tablet 1    ixekizumab (Taltz Autoinjector) 80 mg/mL injection Inject 1 mL (80 mg) under the skin every 28 (twenty-eight) days.      lancets misc Check blood sugars once daily 100 each 3    lisinopril 40 mg tablet Take 1 tablet (40 mg) by mouth once daily. 90 tablet 1    sertraline (Zoloft) 50 mg tablet Take 1 tablet (50 mg) by mouth once daily. as directed 90 tablet 1    albuterol 2.5 mg /3 mL (0.083 %) nebulizer solution Take 3 mL (2.5 mg) by nebulization every 6 hours if needed for wheezing. 1080 mL 1     No current facility-administered medications on file prior to visit.      Review of Systems   Constitutional:  Negative for chills, fatigue and fever.   HENT:  Negative for congestion, sore throat and trouble swallowing.    Eyes:  Negative for visual disturbance.   Respiratory:  Negative for cough, shortness of breath and wheezing.    Cardiovascular:  Negative for chest pain, palpitations and leg swelling.   Gastrointestinal:  Negative for abdominal pain, constipation, diarrhea, nausea and vomiting.   Endocrine: Negative for cold intolerance and heat intolerance.  "  Genitourinary:  Negative for difficulty urinating.   Musculoskeletal:  Negative for arthralgias, joint swelling and neck pain.   Skin:  Negative for color change and wound.   Neurological:  Negative for dizziness, seizures, syncope, facial asymmetry, speech difficulty, weakness, light-headedness, numbness and headaches.   Hematological:  Negative for adenopathy.   Psychiatric/Behavioral:  Negative for behavioral problems, confusion and sleep disturbance.      Vitals:    09/04/24 0823   BP: 135/72   BP Location: Right arm   Patient Position: Sitting   BP Cuff Size: Adult   Pulse: 56   Weight: 70.8 kg (156 lb)   Height: 1.651 m (5' 5\")      Objective   Physical Exam  Constitutional:       Appearance: Normal appearance.   HENT:      Head: Normocephalic.      Right Ear: External ear normal.      Left Ear: External ear normal.      Nose: Nose normal.      Mouth/Throat:      Mouth: Mucous membranes are moist.   Eyes:      Extraocular Movements: Extraocular movements intact.   Neck:      Vascular: No carotid bruit.   Cardiovascular:      Rate and Rhythm: Normal rate and regular rhythm.      Pulses: Normal pulses.      Comments: Diffuse spider veins bilateral lower extremities. No appreciable swelling bilateral lower extremities    RLE ropy cluster of VV medial thigh, no surrounding erythema or warmth  Pulmonary:      Effort: Pulmonary effort is normal. No respiratory distress.      Breath sounds: Normal breath sounds.   Abdominal:      Palpations: Abdomen is soft.      Tenderness: There is no abdominal tenderness.   Musculoskeletal:         General: No swelling or tenderness.      Cervical back: Normal range of motion and neck supple.      Right lower leg: No edema.      Left lower leg: No edema.   Skin:     General: Skin is warm and dry.      Capillary Refill: Capillary refill takes less than 2 seconds.      Findings: No lesion.   Neurological:      General: No focal deficit present.      Mental Status: She is alert and " oriented to person, place, and time. Mental status is at baseline.   Psychiatric:         Mood and Affect: Mood normal.         Behavior: Behavior normal.         Thought Content: Thought content normal.         Judgment: Judgment normal.         Assessment/Plan   Problem List Items Addressed This Visit             ICD-10-CM    Varicose veins of both lower extremities with inflammation I83.11, I83.12    Relevant Orders    Vascular US lower extremity venous insufficiency right   69 year old female with past medical history of Htn, HLD, asthma, osteopenia, psoriasis, NIDDM presents as a new patient for evaluation of RLE varicose veins. Prior Dr Oviedo patient ~2017ish. History of bilateral GSV ablation for bilateral lower extremity VV and swelling. Recent superficial phlebitis RLE.   -Will obtain VI study, will mychart with results  -discussed compression and elevation  -ambulation encouraged  -Further recommendations pending testing  -call the office with questions or concerns         Rashmi Damon PA-C 09/04/24 3:05 PM

## 2024-09-30 ENCOUNTER — HOSPITAL ENCOUNTER (OUTPATIENT)
Dept: VASCULAR MEDICINE | Facility: HOSPITAL | Age: 69
Discharge: HOME | End: 2024-09-30
Payer: MEDICARE

## 2024-09-30 DIAGNOSIS — I83.11 VARICOSE VEINS OF BOTH LOWER EXTREMITIES WITH INFLAMMATION: ICD-10-CM

## 2024-09-30 DIAGNOSIS — I83.12 VARICOSE VEINS OF BOTH LOWER EXTREMITIES WITH INFLAMMATION: ICD-10-CM

## 2024-09-30 DIAGNOSIS — M79.89 OTHER SPECIFIED SOFT TISSUE DISORDERS: ICD-10-CM

## 2024-09-30 PROCEDURE — 93971 EXTREMITY STUDY: CPT

## 2024-09-30 PROCEDURE — 93971 EXTREMITY STUDY: CPT | Performed by: INTERNAL MEDICINE

## 2024-10-22 ENCOUNTER — OFFICE VISIT (OUTPATIENT)
Dept: VASCULAR SURGERY | Facility: HOSPITAL | Age: 69
End: 2024-10-22
Payer: MEDICARE

## 2024-10-22 VITALS
WEIGHT: 154 LBS | DIASTOLIC BLOOD PRESSURE: 74 MMHG | HEART RATE: 58 BPM | SYSTOLIC BLOOD PRESSURE: 136 MMHG | BODY MASS INDEX: 25.63 KG/M2

## 2024-10-22 DIAGNOSIS — I83.11 VARICOSE VEINS OF BOTH LOWER EXTREMITIES WITH INFLAMMATION: Primary | ICD-10-CM

## 2024-10-22 DIAGNOSIS — I83.12 VARICOSE VEINS OF BOTH LOWER EXTREMITIES WITH INFLAMMATION: Primary | ICD-10-CM

## 2024-10-22 PROCEDURE — 99213 OFFICE O/P EST LOW 20 MIN: CPT | Performed by: SURGERY

## 2024-10-22 PROCEDURE — 1036F TOBACCO NON-USER: CPT | Performed by: SURGERY

## 2024-10-22 PROCEDURE — 3078F DIAST BP <80 MM HG: CPT | Performed by: SURGERY

## 2024-10-22 PROCEDURE — 4010F ACE/ARB THERAPY RXD/TAKEN: CPT | Performed by: SURGERY

## 2024-10-22 PROCEDURE — 3075F SYST BP GE 130 - 139MM HG: CPT | Performed by: SURGERY

## 2024-10-22 PROCEDURE — 1159F MED LIST DOCD IN RCRD: CPT | Performed by: SURGERY

## 2024-10-22 PROCEDURE — 1123F ACP DISCUSS/DSCN MKR DOCD: CPT | Performed by: SURGERY

## 2024-10-22 PROCEDURE — 1157F ADVNC CARE PLAN IN RCRD: CPT | Performed by: SURGERY

## 2024-10-22 PROCEDURE — 3051F HG A1C>EQUAL 7.0%<8.0%: CPT | Performed by: SURGERY

## 2024-10-22 NOTE — PROGRESS NOTES
Subjective   Patient ID: Tonya Bravo is a 69 y.o. female who presents for Follow-up (Results/surgical consult /VV ).  HPI  Patient is follow-up secondary to recent VI study  At this time she is well  Active and amatory  She has pain and discomfort along course of long and anterior excess.  Varicosity  This is been present for several years  Previous GSV ablation and overall symptoms have improved    Review of Systems    Objective   Physical Exam  Physical exam    Constitutional: alert and in no acute distress verbal  Eyes: No erythema swelling or discharge noted  Neck: supple, symmetric, trachea midline, no masses noted  Cardiovascular: Carotid pulses 2+, no obvious bruit, no Jugular distension noted, no thrill, heart regular rate, lower extremity vascular exam intact, cap refill <2 sec  Pulmonary:  Bilateral breath sounds intact, clear with rales rhonchi or wheeze  Abdomen: soft non tender, no pulsatile masses noted, no rebound rigidity or guarding noted  Skin: intact warm no abnormal turgor  Psychiatric: alert without any obvious cognitive issues, oriented to person, place, and time  Large anterior accessory varicosity    Assessment/Plan   Symptomatic varicose vein  Patient has a subdermal junction reflux  Large anterior accessory vein with reflux noted  We discussed faye response complications of high ligation stab phlebectomy right lower extremity  Complication not limited to infection bleed deep vein thrombosis all questions were addressed secondary symptomatic nature patient does understand wishes to proceed.           Quinn Oviedo DO 10/22/24 9:14 AM

## 2024-10-23 DIAGNOSIS — F41.9 ANXIETY: ICD-10-CM

## 2024-10-23 DIAGNOSIS — E11.65 TYPE 2 DIABETES MELLITUS WITH HYPERGLYCEMIA, WITHOUT LONG-TERM CURRENT USE OF INSULIN: ICD-10-CM

## 2024-10-23 DIAGNOSIS — F32.1 MODERATE MAJOR DEPRESSION (MULTI): ICD-10-CM

## 2024-10-23 DIAGNOSIS — I10 ESSENTIAL HYPERTENSION: ICD-10-CM

## 2024-10-23 RX ORDER — GLIPIZIDE 10 MG/1
10 TABLET ORAL 2 TIMES DAILY
Qty: 180 TABLET | Refills: 1 | OUTPATIENT
Start: 2024-10-23

## 2024-10-23 RX ORDER — LISINOPRIL 40 MG/1
40 TABLET ORAL DAILY
Qty: 90 TABLET | Refills: 1 | OUTPATIENT
Start: 2024-10-23

## 2024-10-23 RX ORDER — SERTRALINE HYDROCHLORIDE 50 MG/1
50 TABLET, FILM COATED ORAL DAILY
Qty: 90 TABLET | Refills: 1 | OUTPATIENT
Start: 2024-10-23

## 2024-10-23 RX ORDER — GLIPIZIDE 10 MG/1
10 TABLET ORAL 2 TIMES DAILY
Qty: 180 TABLET | Refills: 1 | Status: SHIPPED | OUTPATIENT
Start: 2024-10-23

## 2024-10-23 RX ORDER — LISINOPRIL 40 MG/1
40 TABLET ORAL DAILY
Qty: 90 TABLET | Refills: 1 | Status: SHIPPED | OUTPATIENT
Start: 2024-10-23 | End: 2025-04-21

## 2024-10-23 RX ORDER — SERTRALINE HYDROCHLORIDE 50 MG/1
50 TABLET, FILM COATED ORAL DAILY
Qty: 90 TABLET | Refills: 1 | Status: SHIPPED | OUTPATIENT
Start: 2024-10-23 | End: 2025-04-21

## 2024-10-29 ENCOUNTER — TELEPHONE (OUTPATIENT)
Dept: VASCULAR SURGERY | Facility: HOSPITAL | Age: 69
End: 2024-10-29
Payer: MEDICARE

## 2024-10-29 ENCOUNTER — PATIENT MESSAGE (OUTPATIENT)
Dept: VASCULAR SURGERY | Facility: HOSPITAL | Age: 69
End: 2024-10-29
Payer: MEDICARE

## 2024-10-31 ENCOUNTER — PRE-ADMISSION TESTING (OUTPATIENT)
Dept: PREADMISSION TESTING | Facility: HOSPITAL | Age: 69
End: 2024-10-31
Payer: MEDICARE

## 2024-10-31 VITALS
TEMPERATURE: 98.3 F | DIASTOLIC BLOOD PRESSURE: 72 MMHG | HEIGHT: 65 IN | BODY MASS INDEX: 25.99 KG/M2 | WEIGHT: 156 LBS | HEART RATE: 62 BPM | OXYGEN SATURATION: 99 % | SYSTOLIC BLOOD PRESSURE: 132 MMHG | RESPIRATION RATE: 20 BRPM

## 2024-10-31 DIAGNOSIS — Z01.818 PRE-OP EVALUATION: Primary | ICD-10-CM

## 2024-10-31 DIAGNOSIS — I10 ESSENTIAL HYPERTENSION: ICD-10-CM

## 2024-10-31 DIAGNOSIS — I83.12 VARICOSE VEINS OF BOTH LOWER EXTREMITIES WITH INFLAMMATION: ICD-10-CM

## 2024-10-31 DIAGNOSIS — I83.11 VARICOSE VEINS OF BOTH LOWER EXTREMITIES WITH INFLAMMATION: ICD-10-CM

## 2024-10-31 DIAGNOSIS — E11.65 TYPE 2 DIABETES MELLITUS WITH HYPERGLYCEMIA, WITHOUT LONG-TERM CURRENT USE OF INSULIN: ICD-10-CM

## 2024-10-31 LAB
ANION GAP SERPL CALC-SCNC: 10 MMOL/L (ref 10–20)
BUN SERPL-MCNC: 14 MG/DL (ref 6–23)
CALCIUM SERPL-MCNC: 9.4 MG/DL (ref 8.6–10.3)
CHLORIDE SERPL-SCNC: 107 MMOL/L (ref 98–107)
CO2 SERPL-SCNC: 25 MMOL/L (ref 21–32)
CREAT SERPL-MCNC: 0.53 MG/DL (ref 0.5–1.05)
EGFRCR SERPLBLD CKD-EPI 2021: >90 ML/MIN/1.73M*2
ERYTHROCYTE [DISTWIDTH] IN BLOOD BY AUTOMATED COUNT: 13.3 % (ref 11.5–14.5)
GLUCOSE SERPL-MCNC: 71 MG/DL (ref 74–99)
HCT VFR BLD AUTO: 40 % (ref 36–46)
HGB BLD-MCNC: 13 G/DL (ref 12–16)
MCH RBC QN AUTO: 29.2 PG (ref 26–34)
MCHC RBC AUTO-ENTMCNC: 32.5 G/DL (ref 32–36)
MCV RBC AUTO: 90 FL (ref 80–100)
NRBC BLD-RTO: 0 /100 WBCS (ref 0–0)
PLATELET # BLD AUTO: 206 X10*3/UL (ref 150–450)
POTASSIUM SERPL-SCNC: 3.9 MMOL/L (ref 3.5–5.3)
RBC # BLD AUTO: 4.45 X10*6/UL (ref 4–5.2)
SODIUM SERPL-SCNC: 138 MMOL/L (ref 136–145)
WBC # BLD AUTO: 5.2 X10*3/UL (ref 4.4–11.3)

## 2024-10-31 PROCEDURE — 82374 ASSAY BLOOD CARBON DIOXIDE: CPT

## 2024-10-31 PROCEDURE — 36415 COLL VENOUS BLD VENIPUNCTURE: CPT

## 2024-10-31 PROCEDURE — 85027 COMPLETE CBC AUTOMATED: CPT

## 2024-10-31 ASSESSMENT — LIFESTYLE VARIABLES: SMOKING_STATUS: NONSMOKER

## 2024-11-05 ENCOUNTER — ANESTHESIA EVENT (OUTPATIENT)
Dept: OPERATING ROOM | Facility: HOSPITAL | Age: 69
End: 2024-11-05
Payer: MEDICARE

## 2024-11-08 ENCOUNTER — ANESTHESIA (OUTPATIENT)
Dept: OPERATING ROOM | Facility: HOSPITAL | Age: 69
End: 2024-11-08
Payer: MEDICARE

## 2024-11-08 ENCOUNTER — PHARMACY VISIT (OUTPATIENT)
Dept: PHARMACY | Facility: CLINIC | Age: 69
End: 2024-11-08
Payer: COMMERCIAL

## 2024-11-08 ENCOUNTER — HOSPITAL ENCOUNTER (OUTPATIENT)
Facility: HOSPITAL | Age: 69
Setting detail: OUTPATIENT SURGERY
Discharge: HOME | End: 2024-11-08
Attending: SURGERY | Admitting: SURGERY
Payer: MEDICARE

## 2024-11-08 ENCOUNTER — APPOINTMENT (OUTPATIENT)
Dept: CARDIOLOGY | Facility: HOSPITAL | Age: 69
End: 2024-11-08
Payer: MEDICARE

## 2024-11-08 VITALS
RESPIRATION RATE: 16 BRPM | TEMPERATURE: 97.4 F | OXYGEN SATURATION: 100 % | BODY MASS INDEX: 25.66 KG/M2 | SYSTOLIC BLOOD PRESSURE: 138 MMHG | HEART RATE: 60 BPM | DIASTOLIC BLOOD PRESSURE: 66 MMHG | WEIGHT: 154 LBS | HEIGHT: 65 IN

## 2024-11-08 DIAGNOSIS — I83.812 VARICOSE VEINS OF LEFT LOWER EXTREMITY WITH PAIN: ICD-10-CM

## 2024-11-08 DIAGNOSIS — I83.12 VARICOSE VEINS OF BOTH LOWER EXTREMITIES WITH INFLAMMATION: Primary | ICD-10-CM

## 2024-11-08 DIAGNOSIS — I83.11 VARICOSE VEINS OF BOTH LOWER EXTREMITIES WITH INFLAMMATION: Primary | ICD-10-CM

## 2024-11-08 LAB — GLUCOSE BLD MANUAL STRIP-MCNC: 123 MG/DL (ref 74–99)

## 2024-11-08 PROCEDURE — 3700000002 HC GENERAL ANESTHESIA TIME - EACH INCREMENTAL 1 MINUTE: Performed by: SURGERY

## 2024-11-08 PROCEDURE — 3600000010 HC OR TIME - EACH INCREMENTAL 1 MINUTE - PROCEDURE LEVEL FIVE: Performed by: SURGERY

## 2024-11-08 PROCEDURE — 7100000001 HC RECOVERY ROOM TIME - INITIAL BASE CHARGE: Performed by: SURGERY

## 2024-11-08 PROCEDURE — 3700000001 HC GENERAL ANESTHESIA TIME - INITIAL BASE CHARGE: Performed by: SURGERY

## 2024-11-08 PROCEDURE — 2500000004 HC RX 250 GENERAL PHARMACY W/ HCPCS (ALT 636 FOR OP/ED): Performed by: ANESTHESIOLOGY

## 2024-11-08 PROCEDURE — 2500000005 HC RX 250 GENERAL PHARMACY W/O HCPCS: Performed by: LICENSED PRACTICAL NURSE

## 2024-11-08 PROCEDURE — 2500000001 HC RX 250 WO HCPCS SELF ADMINISTERED DRUGS (ALT 637 FOR MEDICARE OP): Performed by: ANESTHESIOLOGY

## 2024-11-08 PROCEDURE — 7100000010 HC PHASE TWO TIME - EACH INCREMENTAL 1 MINUTE: Performed by: SURGERY

## 2024-11-08 PROCEDURE — 7100000009 HC PHASE TWO TIME - INITIAL BASE CHARGE: Performed by: SURGERY

## 2024-11-08 PROCEDURE — 2720000007 HC OR 272 NO HCPCS: Performed by: SURGERY

## 2024-11-08 PROCEDURE — 3600000005 HC OR TIME - INITIAL BASE CHARGE - PROCEDURE LEVEL FIVE: Performed by: SURGERY

## 2024-11-08 PROCEDURE — 93010 ELECTROCARDIOGRAM REPORT: CPT | Performed by: STUDENT IN AN ORGANIZED HEALTH CARE EDUCATION/TRAINING PROGRAM

## 2024-11-08 PROCEDURE — 93005 ELECTROCARDIOGRAM TRACING: CPT

## 2024-11-08 PROCEDURE — 7100000002 HC RECOVERY ROOM TIME - EACH INCREMENTAL 1 MINUTE: Performed by: SURGERY

## 2024-11-08 PROCEDURE — 96372 THER/PROPH/DIAG INJ SC/IM: CPT | Performed by: LICENSED PRACTICAL NURSE

## 2024-11-08 PROCEDURE — 2500000004 HC RX 250 GENERAL PHARMACY W/ HCPCS (ALT 636 FOR OP/ED): Performed by: LICENSED PRACTICAL NURSE

## 2024-11-08 PROCEDURE — 2500000004 HC RX 250 GENERAL PHARMACY W/ HCPCS (ALT 636 FOR OP/ED): Mod: JZ | Performed by: SURGERY

## 2024-11-08 PROCEDURE — 82947 ASSAY GLUCOSE BLOOD QUANT: CPT

## 2024-11-08 PROCEDURE — 37765 STAB PHLEB VEINS XTR 10-20: CPT | Performed by: SURGERY

## 2024-11-08 PROCEDURE — RXMED WILLOW AMBULATORY MEDICATION CHARGE

## 2024-11-08 PROCEDURE — 37700 LIGATION&DIV LONG SAPH VEIN: CPT | Performed by: SURGERY

## 2024-11-08 PROCEDURE — 2500000005 HC RX 250 GENERAL PHARMACY W/O HCPCS: Performed by: ANESTHESIOLOGY

## 2024-11-08 RX ORDER — ONDANSETRON HYDROCHLORIDE 2 MG/ML
4 INJECTION, SOLUTION INTRAVENOUS ONCE
Status: COMPLETED | OUTPATIENT
Start: 2024-11-08 | End: 2024-11-08

## 2024-11-08 RX ORDER — ACETAMINOPHEN 325 MG/1
975 TABLET ORAL ONCE
Status: COMPLETED | OUTPATIENT
Start: 2024-11-08 | End: 2024-11-08

## 2024-11-08 RX ORDER — OXYCODONE AND ACETAMINOPHEN 5; 325 MG/1; MG/1
1 TABLET ORAL EVERY 6 HOURS PRN
Qty: 10 TABLET | Refills: 0 | Status: SHIPPED | OUTPATIENT
Start: 2024-11-08

## 2024-11-08 RX ORDER — KETOROLAC TROMETHAMINE 30 MG/ML
INJECTION, SOLUTION INTRAMUSCULAR; INTRAVENOUS AS NEEDED
Status: DISCONTINUED | OUTPATIENT
Start: 2024-11-08 | End: 2024-11-08

## 2024-11-08 RX ORDER — ONDANSETRON HYDROCHLORIDE 2 MG/ML
4 INJECTION, SOLUTION INTRAVENOUS ONCE AS NEEDED
Status: DISCONTINUED | OUTPATIENT
Start: 2024-11-08 | End: 2024-11-08 | Stop reason: HOSPADM

## 2024-11-08 RX ORDER — FAMOTIDINE 10 MG/ML
20 INJECTION INTRAVENOUS ONCE
Status: COMPLETED | OUTPATIENT
Start: 2024-11-08 | End: 2024-11-08

## 2024-11-08 RX ORDER — SODIUM CITRATE AND CITRIC ACID MONOHYDRATE 334; 500 MG/5ML; MG/5ML
30 SOLUTION ORAL ONCE
Status: COMPLETED | OUTPATIENT
Start: 2024-11-08 | End: 2024-11-08

## 2024-11-08 RX ORDER — PROPOFOL 10 MG/ML
INJECTION, EMULSION INTRAVENOUS AS NEEDED
Status: DISCONTINUED | OUTPATIENT
Start: 2024-11-08 | End: 2024-11-08

## 2024-11-08 RX ORDER — SODIUM CHLORIDE 9 MG/ML
20 INJECTION, SOLUTION INTRAVENOUS CONTINUOUS
Status: DISCONTINUED | OUTPATIENT
Start: 2024-11-08 | End: 2024-11-08 | Stop reason: HOSPADM

## 2024-11-08 RX ORDER — FENTANYL CITRATE 50 UG/ML
INJECTION, SOLUTION INTRAMUSCULAR; INTRAVENOUS AS NEEDED
Status: DISCONTINUED | OUTPATIENT
Start: 2024-11-08 | End: 2024-11-08

## 2024-11-08 RX ORDER — CEFAZOLIN SODIUM 2 G/100ML
2 INJECTION, SOLUTION INTRAVENOUS ONCE
Status: COMPLETED | OUTPATIENT
Start: 2024-11-08 | End: 2024-11-08

## 2024-11-08 RX ORDER — LIDOCAINE HCL/PF 100 MG/5ML
SYRINGE (ML) INTRAVENOUS AS NEEDED
Status: DISCONTINUED | OUTPATIENT
Start: 2024-11-08 | End: 2024-11-08

## 2024-11-08 RX ORDER — NORETHINDRONE AND ETHINYL ESTRADIOL 0.5-0.035
KIT ORAL AS NEEDED
Status: DISCONTINUED | OUTPATIENT
Start: 2024-11-08 | End: 2024-11-08

## 2024-11-08 RX ORDER — ALBUTEROL SULFATE 0.83 MG/ML
2.5 SOLUTION RESPIRATORY (INHALATION) ONCE
Status: DISCONTINUED | OUTPATIENT
Start: 2024-11-08 | End: 2024-11-08 | Stop reason: HOSPADM

## 2024-11-08 RX ORDER — HYDROMORPHONE HYDROCHLORIDE 0.2 MG/ML
0.2 INJECTION INTRAMUSCULAR; INTRAVENOUS; SUBCUTANEOUS EVERY 5 MIN PRN
Status: DISCONTINUED | OUTPATIENT
Start: 2024-11-08 | End: 2024-11-08 | Stop reason: HOSPADM

## 2024-11-08 RX ORDER — METOCLOPRAMIDE HYDROCHLORIDE 5 MG/ML
10 INJECTION INTRAMUSCULAR; INTRAVENOUS ONCE
Status: COMPLETED | OUTPATIENT
Start: 2024-11-08 | End: 2024-11-08

## 2024-11-08 SDOH — HEALTH STABILITY: MENTAL HEALTH: CURRENT SMOKER: 0

## 2024-11-08 ASSESSMENT — PAIN SCALES - GENERAL
PAIN_LEVEL: 2
PAINLEVEL_OUTOF10: 2
PAINLEVEL_OUTOF10: 4
PAINLEVEL_OUTOF10: 5 - MODERATE PAIN
PAINLEVEL_OUTOF10: 2
PAINLEVEL_OUTOF10: 2
PAINLEVEL_OUTOF10: 0 - NO PAIN
PAINLEVEL_OUTOF10: 2

## 2024-11-08 ASSESSMENT — COLUMBIA-SUICIDE SEVERITY RATING SCALE - C-SSRS
6. HAVE YOU EVER DONE ANYTHING, STARTED TO DO ANYTHING, OR PREPARED TO DO ANYTHING TO END YOUR LIFE?: NO
2. HAVE YOU ACTUALLY HAD ANY THOUGHTS OF KILLING YOURSELF?: NO
1. IN THE PAST MONTH, HAVE YOU WISHED YOU WERE DEAD OR WISHED YOU COULD GO TO SLEEP AND NOT WAKE UP?: NO

## 2024-11-08 ASSESSMENT — PAIN - FUNCTIONAL ASSESSMENT
PAIN_FUNCTIONAL_ASSESSMENT: 0-10
PAIN_FUNCTIONAL_ASSESSMENT: 0-10

## 2024-11-08 NOTE — ANESTHESIA POSTPROCEDURE EVALUATION
Patient: Tonya Bravo    Procedure Summary       Date: 11/08/24 Room / Location: [facility] OR 08 / Virtual [facility] OR    Anesthesia Start: 0957 Anesthesia Stop: 1041    Procedures:       Right High Ligation of Lower Extremity with Stab Phlebectomy (Right)      . (Right) Diagnosis:       Varicose veins of both lower extremities with inflammation      (I83.11, I83.12)    Surgeons: Quinn Oviedo DO Responsible Provider: SILVANO Alvarado    Anesthesia Type: MAC ASA Status: 3            Anesthesia Type: MAC    Vitals Value Taken Time   /62 11/08/24 1115   Temp 36.3 °C (97.4 °F) 11/08/24 1039   Pulse 70 11/08/24 1115   Resp 11 11/08/24 1115   SpO2 100 % 11/08/24 1115       Anesthesia Post Evaluation    Patient location during evaluation: PACU  Patient participation: complete - patient participated  Level of consciousness: awake and alert  Pain score: 2  Pain management: satisfactory to patient  Airway patency: patent  Cardiovascular status: hemodynamically stable  Respiratory status: room air  Hydration status: acceptable  Postoperative Nausea and Vomiting: none    There were no known notable events for this encounter.

## 2024-11-08 NOTE — OP NOTE
Right High Ligation of Lower Extremity with Stab Phlebectomy (R), . (R) Operative Note     Date: 2024  OR Location: [facility]    Name: Tonya Bravo, : 1955, Age: 69 y.o., MRN: [id number], Sex: female    Diagnosis  Pre-op Diagnosis      * Varicose veins of both lower extremities with inflammation [I83.11, I83.12] Post-op Diagnosis     * Varicose veins of both lower extremities with inflammation [I83.11, I83.12]     Procedures  Right High Ligation of Lower Extremity with Stab Phlebectomy  83019 - ID LIG&DIV LONG SAPH VEIN SAPHFEM JUNCT/INTERRUPJ    .  05784 - ID STAB PHLEBT VARICOSE VEINS 1 XTR 10-20 STAB INCS      Surgeons      * Quinn Oviedo - Primary    Resident/Fellow/Other Assistant:  Surgeons and Role:  * No surgeons found with a matching role *    Staff:   Circulator: Meri  Scrub Person: Juany  Surgical Assistant: Torsten    Anesthesia Staff: CRNA: SILVANO Alvarado    Procedure Summary  Anesthesia: Monitor Anesthesia Care  ASA: III  Estimated Blood Loss: 25mL  Intra-op Medications:   Administrations occurring from 1019 to 1159 on 24:   Medication Name Total Dose   dexmedeTOMIDine (Precedex) bolus from bag 4 mcg   ketorolac (Toradol) IM injection 60 mg/2 mL 30 mg   sodium chloride 0.9% infusion Cannot be calculated              Anesthesia Record               Intraprocedure I/O Totals          Intake    Dexmedetomidine 0.00 mL    The total shown is the total volume documented since Anesthesia Start was filed.    sodium chloride 0.9% infusion 250.00 mL    Total Intake 250 mL          Specimen: No specimens collected              Drains and/or Catheters: * None in log *    Tourniquet Times:         Implants:     Findings: varicose veins right    Indications: Tonya Bravo is an 69 y.o. female who is having surgery for I83.11, I83.12. Symptomatic varicose veins with saphenofemoral junction reflux    The patient was seen in the preoperative area. The risks, benefits,  complications, treatment options, non-operative alternatives, expected recovery and outcomes were discussed with the patient. The possibilities of reaction to medication, pulmonary aspiration, injury to surrounding structures, bleeding, recurrent infection, the need for additional procedures, failure to diagnose a condition, and creating a complication requiring transfusion or operation were discussed with the patient. The patient concurred with the proposed plan, giving informed consent.  The site of surgery was properly noted/marked if necessary per policy. The patient has been actively warmed in preoperative area. Preoperative antibiotics have been ordered and given within 1 hours of incision. Venous thrombosis prophylaxis are not indicated.    Procedure Details: Patient is brought to the OR suite placed in supine position administered general anesthetic with LMA respiratory support.  Right lower extremity sterilely prepped and draped standard fashion.  Groin incision was made subcutaneous dissection electrocautery the stab femoral junction identified all small side branches noted ligated with 2-0 silk suture and the junction was ligated with 0 silk suture.  Patient taught this post procedure well without event copioudy irrigated wound separately cervical sutures were placed as well as Monocryl for skin.  Sterile dressing was applied.  Patient was preoperatively marked in the right anterior and medial thigh.  Stolt small stab phlebectomy incision was made most distally vein identified distal ligated with 4-0 silk suture this was traced superiorly multiple incisions were made total stab phlebectomy incisions x 13.  Patient Toller procedure well X hemostasis maintained Steri-Strips were applied and compressive wrap was placed patient tolerated procedure well woken extubated sent to PACU in stable condition.    Complications:  None; patient tolerated the procedure well.    Disposition: PACU - hemodynamically  stable.  Condition: stable         Task Performed by RNFA or Surgical Assistant:             Additional Details:      Attending Attestation: I was present and scrubbed for the entire procedure.    Quinn Oviedo  Phone Number: [phone]

## 2024-11-08 NOTE — ANESTHESIA PROCEDURE NOTES
Airway  Date/Time: 11/8/2024 10:00 AM  Urgency: elective    Airway not difficult    Staffing  Performed: CRNA   Authorized by: SILVANO Alvarado    Performed by: SILVANO Alvarado  Patient location during procedure: OR    Indications and Patient Condition  Indications for airway management: anesthesia  Spontaneous ventilation: present  Sedation level: deep  Preoxygenated: yes  Patient position: sniffing  Mask difficulty assessment: 0 - not attempted  No planned trial extubation    Final Airway Details  Final airway type: supraglottic airway      Successful airway: Supraglottic airway: i-gel.  Size 4     Number of attempts at approach: 1  Ventilation between attempts: BVM  Number of other approaches attempted: 0    Additional Comments  Inserted without difficulty, dentition and lips in pre-induction condition,

## 2024-11-08 NOTE — ANESTHESIA PREPROCEDURE EVALUATION
Patient: Tonya Bravo    Procedure Information       Date/Time: 11/08/24 1019    Procedures:       Right High Ligation of Lower Extremity with Stab Phlebectomy (Right)      . (Right) - 60 minutes procedure time    Location: [facility] 08 / Virtual [facility] OR    Surgeons: Quinn Oviedo, DO            Relevant Problems   Anesthesia (within normal limits)  FAMILY HX OF [facility]      Cardiac   (+) Atrial premature depolarization   (+) Essential hypertension   (+) Mixed hyperlipidemia      Pulmonary   (+) Mild intermittent asthma without complication ([facility])      Neuro   (+) Anxiety   (+) Moderate major depression (Multi)      GI   (+) Gastroesophageal reflux disease without esophagitis   (+) Hiatal hernia      Endocrine   (+) Type 2 diabetes mellitus with hyperglycemia, without long-term current use of insulin       Clinical information reviewed:   Tobacco  Allergies  Meds  Problems  Med Hx  Surg Hx   Fam Hx  Soc   Hx        NPO Detail:  NPO/Void Status  Date of Last Liquid: 11/07/24  Time of Last Liquid: 2200  Date of Last Solid: 11/07/24  Time of Last Solid: 2200  Last Intake Type: Clear fluids         Physical Exam    Airway  Mallampati: II  TM distance: >3 FB  Neck ROM: full     Cardiovascular   Rhythm: irregular  Rate: normal     Dental    Pulmonary - normal exam  Breath sounds clear to auscultation     Abdominal - normal exam  Abdomen: soft         Anesthesia Plan    History of general anesthesia?: yes  History of complications of general anesthesia?: no    ASA 3     general     The patient is not a current smoker.  Patient did not smoke on day of procedure.    intravenous induction   Postoperative administration of opioids is intended.  Trial extubation is planned.  Anesthetic plan and risks discussed with patient.  Use of blood products discussed with who consented to blood products.    Plan discussed with CRNA.

## 2024-11-09 LAB
ATRIAL RATE: 110 BPM
P AXIS: 55 DEGREES
PR INTERVAL: 148 MS
Q ONSET: 251 MS
QRS COUNT: 10 BEATS
QRS DURATION: 89 MS
QT INTERVAL: 407 MS
QTC CALCULATION(BAZETT): 393 MS
QTC FREDERICIA: 398 MS
R AXIS: 39 DEGREES
T AXIS: 47 DEGREES
T OFFSET: 455 MS
VENTRICULAR RATE: 56 BPM

## 2024-11-20 ENCOUNTER — OFFICE VISIT (OUTPATIENT)
Dept: VASCULAR SURGERY | Facility: HOSPITAL | Age: 69
End: 2024-11-20
Payer: MEDICARE

## 2024-11-20 VITALS — DIASTOLIC BLOOD PRESSURE: 82 MMHG | HEART RATE: 80 BPM | SYSTOLIC BLOOD PRESSURE: 149 MMHG

## 2024-11-20 DIAGNOSIS — I83.11 VARICOSE VEINS OF BOTH LOWER EXTREMITIES WITH INFLAMMATION: Primary | ICD-10-CM

## 2024-11-20 DIAGNOSIS — I83.12 VARICOSE VEINS OF BOTH LOWER EXTREMITIES WITH INFLAMMATION: Primary | ICD-10-CM

## 2024-11-20 PROCEDURE — 3077F SYST BP >= 140 MM HG: CPT | Performed by: PHYSICIAN ASSISTANT

## 2024-11-20 PROCEDURE — 99211 OFF/OP EST MAY X REQ PHY/QHP: CPT | Performed by: PHYSICIAN ASSISTANT

## 2024-11-20 PROCEDURE — 1157F ADVNC CARE PLAN IN RCRD: CPT | Performed by: PHYSICIAN ASSISTANT

## 2024-11-20 PROCEDURE — 4010F ACE/ARB THERAPY RXD/TAKEN: CPT | Performed by: PHYSICIAN ASSISTANT

## 2024-11-20 PROCEDURE — 1159F MED LIST DOCD IN RCRD: CPT | Performed by: PHYSICIAN ASSISTANT

## 2024-11-20 PROCEDURE — 1036F TOBACCO NON-USER: CPT | Performed by: PHYSICIAN ASSISTANT

## 2024-11-20 PROCEDURE — 3051F HG A1C>EQUAL 7.0%<8.0%: CPT | Performed by: PHYSICIAN ASSISTANT

## 2024-11-20 PROCEDURE — 1123F ACP DISCUSS/DSCN MKR DOCD: CPT | Performed by: PHYSICIAN ASSISTANT

## 2024-11-20 PROCEDURE — 3079F DIAST BP 80-89 MM HG: CPT | Performed by: PHYSICIAN ASSISTANT

## 2024-11-20 ASSESSMENT — ENCOUNTER SYMPTOMS
PALPITATIONS: 0
NECK PAIN: 0
SORE THROAT: 0
CHILLS: 0
WHEEZING: 0
CONSTIPATION: 0
VOMITING: 0
CONFUSION: 0
TROUBLE SWALLOWING: 0
COUGH: 0
ABDOMINAL PAIN: 0
SHORTNESS OF BREATH: 0
JOINT SWELLING: 0
SLEEP DISTURBANCE: 0
WOUND: 0
WEAKNESS: 0
FACIAL ASYMMETRY: 0
FATIGUE: 0
ADENOPATHY: 0
DIFFICULTY URINATING: 0
NAUSEA: 0
HEADACHES: 0
DIZZINESS: 0
ARTHRALGIAS: 0
SEIZURES: 0
FEVER: 0
COLOR CHANGE: 0
DIARRHEA: 0
LIGHT-HEADEDNESS: 0
NUMBNESS: 0
SPEECH DIFFICULTY: 0

## 2024-11-20 NOTE — PROGRESS NOTES
Subjective   Patient ID: Tonya Bravo is a 69 y.o. female who presents for Follow-up (S/p right high ligation/ stab phlebetomy).  HPI  69 year old female presents for follow up s/p right GSV high ligation and stab phlebectomy 11/8/2024. Overall doing well. Some right thigh bruising, Has already noticed improvement of symptoms. No swelling. Minimal pain. Ambulating.         Initial consult note:69 year old female with past medical history of Htn, HLD, asthma, osteopenia, psoriasis, NIDDM presents as a new patient for evaluation of RLE varicose veins. Prior Dr Oviedo patient ~2017ish. History of bilateral GSV ablation for bilateral lower extremity VV and swelling. Symptoms improved post ablation. Has been symptom free until recently. Had a bough of superficial phlebitis on the right medial thigh July 2024, resolved with heat and NSAIDs after 2 weeks. No associated swelling, continues to be itchy with rope like VV in that area. Does not wear compression. Ambulatory. Active. No claudication or rest pain no wounds or ulcerations.   Review of Systems   Constitutional:  Negative for chills, fatigue and fever.   HENT:  Negative for congestion, sore throat and trouble swallowing.    Eyes:  Negative for visual disturbance.   Respiratory:  Negative for cough, shortness of breath and wheezing.    Cardiovascular:  Negative for chest pain, palpitations and leg swelling.   Gastrointestinal:  Negative for abdominal pain, constipation, diarrhea, nausea and vomiting.   Endocrine: Negative for cold intolerance and heat intolerance.   Genitourinary:  Negative for difficulty urinating.   Musculoskeletal:  Negative for arthralgias, joint swelling and neck pain.   Skin:  Negative for color change and wound.   Neurological:  Negative for dizziness, seizures, syncope, facial asymmetry, speech difficulty, weakness, light-headedness, numbness and headaches.   Hematological:  Negative for adenopathy.   Psychiatric/Behavioral:  Negative  for behavioral problems, confusion and sleep disturbance.      Vitals:    11/20/24 1538   BP: 149/82   Pulse: 80      Objective   Physical Exam  Constitutional:       Appearance: Normal appearance.   HENT:      Mouth/Throat:      Mouth: Mucous membranes are moist.   Cardiovascular:      Comments: Right groin incision is c/d/I well approximated healing nicely    Right  medial thigh bruising, stabs healing nicely    Minimal swelling RLE  Pulmonary:      Effort: Pulmonary effort is normal.   Musculoskeletal:      Right lower leg: No edema.      Left lower leg: No edema.   Skin:     Findings: Bruising present.   Neurological:      General: No focal deficit present.      Mental Status: She is alert and oriented to person, place, and time.   Psychiatric:         Mood and Affect: Mood normal.         Behavior: Behavior normal.         Thought Content: Thought content normal.         Judgment: Judgment normal.         Assessment/Plan   Problem List Items Addressed This Visit             ICD-10-CM    Varicose veins of both lower extremities with inflammation - Primary I83.11, I83.12   69 year old female presents for follow up s/p right GSV high ligation and stab phlebectomy 11/8/2024. Overall doing well. Some right thigh bruising, Has already noticed improvement of symptoms. No swelling. Minimal pain. Ambulating.   -Advance activity as tolerated  -compression/elevation as needed  -ice to bruising as needed  -OK to use scar cream on groin incision in 4 weeks  -follow up as needed  -call with questions or concerns         Rashmi Damon PA-C 11/20/24 3:51 PM

## 2024-12-13 DIAGNOSIS — E78.2 MIXED HYPERLIPIDEMIA: ICD-10-CM

## 2024-12-13 RX ORDER — ATORVASTATIN CALCIUM 80 MG/1
80 TABLET, FILM COATED ORAL DAILY
Qty: 90 TABLET | Refills: 1 | OUTPATIENT
Start: 2024-12-13

## 2024-12-16 ENCOUNTER — APPOINTMENT (OUTPATIENT)
Dept: PRIMARY CARE | Facility: CLINIC | Age: 69
End: 2024-12-16
Payer: MEDICARE

## 2024-12-16 VITALS
SYSTOLIC BLOOD PRESSURE: 129 MMHG | RESPIRATION RATE: 16 BRPM | WEIGHT: 149 LBS | HEIGHT: 65 IN | BODY MASS INDEX: 24.83 KG/M2 | HEART RATE: 56 BPM | OXYGEN SATURATION: 100 % | DIASTOLIC BLOOD PRESSURE: 71 MMHG

## 2024-12-16 DIAGNOSIS — E11.29 MICROALBUMINURIA DUE TO TYPE 2 DIABETES MELLITUS (MULTI): ICD-10-CM

## 2024-12-16 DIAGNOSIS — F32.1 MODERATE MAJOR DEPRESSION (MULTI): ICD-10-CM

## 2024-12-16 DIAGNOSIS — Z78.0 POSTMENOPAUSAL: ICD-10-CM

## 2024-12-16 DIAGNOSIS — E78.2 MIXED HYPERLIPIDEMIA: ICD-10-CM

## 2024-12-16 DIAGNOSIS — I10 ESSENTIAL HYPERTENSION: ICD-10-CM

## 2024-12-16 DIAGNOSIS — L40.9 PSORIASIS: ICD-10-CM

## 2024-12-16 DIAGNOSIS — J45.20 MILD INTERMITTENT ASTHMA WITHOUT COMPLICATION (HHS-HCC): ICD-10-CM

## 2024-12-16 DIAGNOSIS — E53.8 COBALAMIN DEFICIENCY: ICD-10-CM

## 2024-12-16 DIAGNOSIS — K21.9 GASTROESOPHAGEAL REFLUX DISEASE WITHOUT ESOPHAGITIS: ICD-10-CM

## 2024-12-16 DIAGNOSIS — Z12.31 VISIT FOR SCREENING MAMMOGRAM: ICD-10-CM

## 2024-12-16 DIAGNOSIS — L40.50 PSORIASIS WITH ARTHROPATHY (MULTI): ICD-10-CM

## 2024-12-16 DIAGNOSIS — R80.9 MICROALBUMINURIA DUE TO TYPE 2 DIABETES MELLITUS (MULTI): ICD-10-CM

## 2024-12-16 DIAGNOSIS — Z11.59 NEED FOR HEPATITIS C SCREENING TEST: ICD-10-CM

## 2024-12-16 DIAGNOSIS — F41.9 ANXIETY: ICD-10-CM

## 2024-12-16 DIAGNOSIS — E11.65 TYPE 2 DIABETES MELLITUS WITH HYPERGLYCEMIA, WITHOUT LONG-TERM CURRENT USE OF INSULIN: Primary | ICD-10-CM

## 2024-12-16 DIAGNOSIS — Z23 ENCOUNTER FOR IMMUNIZATION: ICD-10-CM

## 2024-12-16 DIAGNOSIS — E55.9 VITAMIN D DEFICIENCY: ICD-10-CM

## 2024-12-16 PROBLEM — E66.3 OVERWEIGHT WITH BODY MASS INDEX (BMI) OF 25 TO 25.9 IN ADULT: Status: RESOLVED | Noted: 2023-04-10 | Resolved: 2024-12-16

## 2024-12-16 LAB
POC ALBUMIN /CREATININE RATIO MANUALLY ENTERED: ABNORMAL UG/MG CREAT
POC HEMOGLOBIN A1C: 6.9 % (ref 4.2–6.5)
POC URINE ALBUMIN: 10 MG/L
POC URINE CREATININE: 50 MG/DL

## 2024-12-16 PROCEDURE — 3008F BODY MASS INDEX DOCD: CPT | Performed by: FAMILY MEDICINE

## 2024-12-16 PROCEDURE — 3074F SYST BP LT 130 MM HG: CPT | Performed by: FAMILY MEDICINE

## 2024-12-16 PROCEDURE — 3078F DIAST BP <80 MM HG: CPT | Performed by: FAMILY MEDICINE

## 2024-12-16 PROCEDURE — 1036F TOBACCO NON-USER: CPT | Performed by: FAMILY MEDICINE

## 2024-12-16 PROCEDURE — 3051F HG A1C>EQUAL 7.0%<8.0%: CPT | Performed by: FAMILY MEDICINE

## 2024-12-16 PROCEDURE — 82044 UR ALBUMIN SEMIQUANTITATIVE: CPT | Performed by: FAMILY MEDICINE

## 2024-12-16 PROCEDURE — G2211 COMPLEX E/M VISIT ADD ON: HCPCS | Performed by: FAMILY MEDICINE

## 2024-12-16 PROCEDURE — 4010F ACE/ARB THERAPY RXD/TAKEN: CPT | Performed by: FAMILY MEDICINE

## 2024-12-16 PROCEDURE — 83036 HEMOGLOBIN GLYCOSYLATED A1C: CPT | Performed by: FAMILY MEDICINE

## 2024-12-16 PROCEDURE — 1160F RVW MEDS BY RX/DR IN RCRD: CPT | Performed by: FAMILY MEDICINE

## 2024-12-16 PROCEDURE — 1123F ACP DISCUSS/DSCN MKR DOCD: CPT | Performed by: FAMILY MEDICINE

## 2024-12-16 PROCEDURE — 1159F MED LIST DOCD IN RCRD: CPT | Performed by: FAMILY MEDICINE

## 2024-12-16 PROCEDURE — 1157F ADVNC CARE PLAN IN RCRD: CPT | Performed by: FAMILY MEDICINE

## 2024-12-16 PROCEDURE — 99214 OFFICE O/P EST MOD 30 MIN: CPT | Performed by: FAMILY MEDICINE

## 2024-12-16 RX ORDER — OMEPRAZOLE 20 MG/1
20 CAPSULE, DELAYED RELEASE ORAL
Qty: 90 CAPSULE | Refills: 1 | Status: SHIPPED | OUTPATIENT
Start: 2024-12-16 | End: 2025-06-14

## 2024-12-16 RX ORDER — GLIPIZIDE 10 MG/1
10 TABLET ORAL 2 TIMES DAILY
Qty: 180 TABLET | Refills: 1 | Status: SHIPPED | OUTPATIENT
Start: 2024-12-16

## 2024-12-16 RX ORDER — ATORVASTATIN CALCIUM 80 MG/1
80 TABLET, FILM COATED ORAL DAILY
Qty: 90 TABLET | Refills: 1 | Status: SHIPPED | OUTPATIENT
Start: 2024-12-16

## 2024-12-16 ASSESSMENT — PATIENT HEALTH QUESTIONNAIRE - PHQ9
SUM OF ALL RESPONSES TO PHQ9 QUESTIONS 1 AND 2: 0
2. FEELING DOWN, DEPRESSED OR HOPELESS: NOT AT ALL
1. LITTLE INTEREST OR PLEASURE IN DOING THINGS: NOT AT ALL

## 2024-12-16 ASSESSMENT — ENCOUNTER SYMPTOMS
HUNGER: 0
POLYPHAGIA: 0
VOMITING: 0
FATIGUE: 0
FREQUENCY: 0
SINUS PAIN: 0
BLACKOUTS: 0
UNEXPECTED WEIGHT CHANGE: 0
DIARRHEA: 0
ADENOPATHY: 0
SWEATS: 0
TREMORS: 0
NERVOUS/ANXIOUS: 0
CHILLS: 0
DYSURIA: 0
SPEECH DIFFICULTY: 0
SHORTNESS OF BREATH: 0
ABDOMINAL PAIN: 0
SINUS PRESSURE: 0
DYSPHORIC MOOD: 0
PALPITATIONS: 0
LIGHT-HEADEDNESS: 0
NUMBNESS: 0
COUGH: 0
WEIGHT LOSS: 1
FEVER: 0
SORE THROAT: 0
CHEST TIGHTNESS: 0
CONFUSION: 0
HEADACHES: 0
WHEEZING: 0
CONSTIPATION: 0
VISUAL CHANGE: 0
NAUSEA: 0
DIZZINESS: 0
DIAPHORESIS: 0
POLYDIPSIA: 0
BLURRED VISION: 0
WEAKNESS: 0
HEMATURIA: 0
SEIZURES: 0

## 2024-12-16 NOTE — ASSESSMENT & PLAN NOTE
- already received flu this season  - recommended the following vaccines at the pharmacy: Shingrix, RSV, PCV 20

## 2024-12-16 NOTE — PROGRESS NOTES
Subjective   Patient ID: Tonya Bravo is a 69 y.o. female who presents for crystal alb and a1c due today in office (Flu given this season/Will get the Prev 20 thru pharmacy due to insurance/Not much of an appetite /Pt would like omeprazole thru script).    Diabetes  She has type 2 diabetes mellitus. No MedicAlert identification noted. The initial diagnosis of diabetes was made 5 years ago. Pertinent negatives for hypoglycemia include no confusion, dizziness, headaches, hunger, mood changes, nervousness/anxiousness, pallor, seizures, sleepiness, speech difficulty, sweats or tremors. Associated symptoms include weight loss. Pertinent negatives for diabetes include no blurred vision, no chest pain, no fatigue, no foot paresthesias, no foot ulcerations, no polydipsia, no polyphagia, no polyuria, no visual change and no weakness. Pertinent negatives for hypoglycemia complications include no blackouts, no hospitalization, no nocturnal hypoglycemia, no required assistance and no required glucagon injection. Symptoms are stable. Pertinent negatives for diabetic complications include no CVA, heart disease, impotence, nephropathy, peripheral neuropathy, PVD or retinopathy. Risk factors for coronary artery disease include dyslipidemia, family history, hypertension and post-menopausal. Current diabetic treatment includes oral agent (dual therapy). She is compliant with treatment all of the time. Her weight is decreasing steadily. She is following a generally healthy diet. When asked about meal planning, she reported none. She has not had a previous visit with a dietitian. She participates in exercise every other day. Her breakfast blood glucose is taken between 8-9 am. Her lunch blood glucose is taken between 12-1 pm. Her dinner blood glucose is taken between 6-7 pm. Her bedtime blood glucose is taken between 10-11 pm. She does not see a podiatrist.Eye exam is current.      Routine Follow Up  routine follow up. chronic  "issues as per assessment and plan.     Underwent Right High Ligation of Lower Extremity with Stab Phlebectomy  with Dr. Oviedo on 11/8/24    Office Visit on 12/16/2024   Component Date Value Ref Range Status   • POC HEMOGLOBIN A1c 12/16/2024 6.9 (A)  4.2 - 6.5 % Final   • POC Urine Albumin 12/16/2024 10  No Reference Range Established mg/L Final   • POC Urine Creatinine 12/16/2024 50  No Reference Range Established mg/dl Final   • POC ALBUMIN /CREATININE RATIO MANU* 12/16/2024 30 - 300 (A)  <30 ug/mg Creat Final        Review of Systems   Constitutional:  Positive for weight loss. Negative for chills, diaphoresis, fatigue, fever and unexpected weight change.   HENT:  Negative for congestion, sinus pressure, sinus pain, sneezing and sore throat.    Eyes:  Negative for blurred vision.   Respiratory:  Negative for cough, chest tightness, shortness of breath and wheezing.    Cardiovascular:  Negative for chest pain, palpitations and leg swelling.   Gastrointestinal:  Negative for abdominal pain, constipation, diarrhea, nausea and vomiting.   Endocrine: Negative for cold intolerance, heat intolerance, polydipsia, polyphagia and polyuria.   Genitourinary:  Negative for dysuria, frequency, hematuria, impotence and urgency.   Skin:  Negative for pallor.   Neurological:  Negative for dizziness, tremors, seizures, syncope, speech difficulty, weakness, light-headedness, numbness and headaches.   Hematological:  Negative for adenopathy.   Psychiatric/Behavioral:  Negative for confusion and dysphoric mood. The patient is not nervous/anxious.        Objective   /71 (BP Location: Right arm, Patient Position: Sitting, BP Cuff Size: Adult)   Pulse 56   Resp 16   Ht 1.651 m (5' 5\")   Wt 67.6 kg (149 lb)   SpO2 100%   BMI 24.79 kg/m²     Physical Exam  Vitals and nursing note reviewed.   Constitutional:       General: She is not in acute distress.     Appearance: Normal appearance.   HENT:      Head: Normocephalic and " atraumatic.      Nose: Nose normal.   Eyes:      Extraocular Movements: Extraocular movements intact.      Conjunctiva/sclera: Conjunctivae normal.      Pupils: Pupils are equal, round, and reactive to light.   Cardiovascular:      Rate and Rhythm: Normal rate and regular rhythm.      Heart sounds: No murmur heard.     No friction rub. No gallop.   Pulmonary:      Effort: Pulmonary effort is normal.      Breath sounds: Normal breath sounds. No wheezing, rhonchi or rales.   Abdominal:      General: Bowel sounds are normal. There is no distension.      Palpations: Abdomen is soft.      Tenderness: There is no abdominal tenderness.   Musculoskeletal:         General: Normal range of motion.      Cervical back: Normal range of motion and neck supple.   Skin:     General: Skin is warm and dry.   Neurological:      General: No focal deficit present.      Mental Status: She is alert and oriented to person, place, and time.      Deep Tendon Reflexes: Reflexes normal.   Psychiatric:         Mood and Affect: Mood normal.         Behavior: Behavior normal.         Thought Content: Thought content normal.         Judgment: Judgment normal.       Assessment/Plan   Problem List Items Addressed This Visit             ICD-10-CM    Anxiety F41.9     - controlled. continue sertaline         Cobalamin deficiency E53.8     - continue vitamin B12         Relevant Orders    Vitamin B12    Encounter for immunization Z23     - already received flu this season  - recommended the following vaccines at the pharmacy: Shingrix, RSV, PCV 20         Essential hypertension I10     - controlled.   - current regimen: lisinopril 40 mg daily          Relevant Orders    Comprehensive Metabolic Panel    TSH with reflex to Free T4 if abnormal    CBC and Auto Differential    Gastroesophageal reflux disease without esophagitis K21.9     - controlled. continue omeprazole         Relevant Medications    omeprazole (PriLOSEC) 20 mg DR capsule     Microalbuminuria due to type 2 diabetes mellitus (Multi) E11.29, R80.9     - continue lisinopril and jardiance          Mild intermittent asthma without complication (HHS-HCC) J45.20     - controlled. Continue albuterol as needed          Mixed hyperlipidemia E78.2     - continue atorvastatin         Relevant Medications    atorvastatin (Lipitor) 80 mg tablet    Other Relevant Orders    Comprehensive Metabolic Panel    Lipid Panel    TSH with reflex to Free T4 if abnormal    CBC and Auto Differential    Moderate major depression (Multi) F32.1     - controlled. 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     - follows with dermatology          Type 2 diabetes mellitus with hyperglycemia, without long-term current use of insulin - Primary E11.65     - HbA1c  (6.9) (7.2) (7.4)  - current regimen: glipizide 10 mg twice daily, jardiance 25 mg daily                Relevant Medications    empagliflozin (Jardiance) 25 mg    glipiZIDE (Glucotrol) 10 mg tablet    Other Relevant Orders    POCT glycosylated hemoglobin (Hb A1C) manually resulted (Completed)    POCT Albumin random urine manually resulted (Completed)    Comprehensive Metabolic Panel    TSH with reflex to Free T4 if abnormal    CBC and Auto Differential    Vitamin D deficiency E55.9     continue vitamin d         Relevant Orders    Vitamin D 25-Hydroxy,Total (for eval of Vitamin D levels)     Other Visit Diagnoses         Codes    Postmenopausal     Z78.0    Relevant Orders    XR DEXA bone density    Need for hepatitis C screening test     Z11.59    Relevant Orders    Hepatitis C Antibody    Visit for screening mammogram     Z12.31    Relevant Orders    BI mammo bilateral screening tomosynthesis

## 2024-12-16 NOTE — PATIENT INSTRUCTIONS
Tonya Bravo ,    Thank you for coming in today. We at [facility] appreciate your trust in our care. If you have any questions or concerns about the care you received today, please do not hesitate to contact us at [phone].    The following instructions were discussed today:   - get labs. For blood work: Nothing to eat or drink for at least 10 hours prior. Okay for water or black coffee.    - get DEXA (bone density) after 1/27/25  - get mammogram   - Follow up 5/1/2025 as scheduled.    - I recommend the following vaccines at the pharmacy: Shingrix, RSV, PCV 20 (prevnar)

## 2024-12-16 NOTE — ASSESSMENT & PLAN NOTE
- HbA1c  (6.9) (7.2) (7.4)  - current regimen: glipizide 10 mg twice daily, jardiance 25 mg daily

## 2025-02-10 PROBLEM — G47.30 SLEEP APNEA: Status: ACTIVE | Noted: 2025-02-10

## 2025-03-06 ENCOUNTER — APPOINTMENT (OUTPATIENT)
Dept: CARDIOLOGY | Facility: CLINIC | Age: 70
End: 2025-03-06
Payer: MEDICARE

## 2025-03-06 VITALS
HEART RATE: 53 BPM | BODY MASS INDEX: 25.33 KG/M2 | WEIGHT: 152 LBS | HEIGHT: 65 IN | DIASTOLIC BLOOD PRESSURE: 70 MMHG | SYSTOLIC BLOOD PRESSURE: 122 MMHG

## 2025-03-06 DIAGNOSIS — I10 ESSENTIAL HYPERTENSION: ICD-10-CM

## 2025-03-06 DIAGNOSIS — I49.1 ATRIAL PREMATURE DEPOLARIZATION: Primary | ICD-10-CM

## 2025-03-06 DIAGNOSIS — Z86.79 HISTORY OF PAROXYSMAL SUPRAVENTRICULAR TACHYCARDIA: ICD-10-CM

## 2025-03-06 PROCEDURE — 99213 OFFICE O/P EST LOW 20 MIN: CPT | Mod: 25 | Performed by: INTERNAL MEDICINE

## 2025-03-06 PROCEDURE — 3008F BODY MASS INDEX DOCD: CPT | Performed by: INTERNAL MEDICINE

## 2025-03-06 PROCEDURE — 3078F DIAST BP <80 MM HG: CPT | Performed by: INTERNAL MEDICINE

## 2025-03-06 PROCEDURE — 4010F ACE/ARB THERAPY RXD/TAKEN: CPT | Performed by: INTERNAL MEDICINE

## 2025-03-06 PROCEDURE — 1160F RVW MEDS BY RX/DR IN RCRD: CPT | Performed by: INTERNAL MEDICINE

## 2025-03-06 PROCEDURE — 3074F SYST BP LT 130 MM HG: CPT | Performed by: INTERNAL MEDICINE

## 2025-03-06 PROCEDURE — 1123F ACP DISCUSS/DSCN MKR DOCD: CPT | Performed by: INTERNAL MEDICINE

## 2025-03-06 PROCEDURE — 1159F MED LIST DOCD IN RCRD: CPT | Performed by: INTERNAL MEDICINE

## 2025-03-06 PROCEDURE — 1036F TOBACCO NON-USER: CPT | Performed by: INTERNAL MEDICINE

## 2025-03-06 PROCEDURE — 1157F ADVNC CARE PLAN IN RCRD: CPT | Performed by: INTERNAL MEDICINE

## 2025-03-06 PROCEDURE — 99213 OFFICE O/P EST LOW 20 MIN: CPT | Performed by: INTERNAL MEDICINE

## 2025-03-06 PROCEDURE — 93005 ELECTROCARDIOGRAM TRACING: CPT | Performed by: INTERNAL MEDICINE

## 2025-03-06 RX ORDER — ACETAMINOPHEN AND PHENYLEPHRINE HCL 325; 5 MG/1; MG/1
1 TABLET ORAL
COMMUNITY

## 2025-03-06 ASSESSMENT — ENCOUNTER SYMPTOMS
OCCASIONAL FEELINGS OF UNSTEADINESS: 0
LOSS OF SENSATION IN FEET: 0

## 2025-03-06 NOTE — PROGRESS NOTES
Chief Complaint:   Follow-up (Annual, OP surgery done and has some questions- nitroglycerin patch)     History Of Present Illness:    oTnya Bravo is a 69 y.o. female presenting for annual follow-up.    Past medical history of hypertension, type 2 diabetes, gastroesophageal reflux disease, hx of of fluttering in the chest with heart rate up to 102-107 bpm, exhaustion, shortness of breath with activities, occasional sweating.  Found she has frequent PACs.    Symptoms have improved since she retired.  Is exercising regularly.  In the last year she has lost 11 pounds with this effort.  Generally feels very well.  She went for a varicose vein surgery and started having frequent PACs although remained asymptomatic.  It appears that anesthesiologist put on a nitro patch and she had a uneventful surgery and recovery.  She does not have chest pain with activities, orthopnea, PND, ankle swelling, or history of loss of consciousness.     We arranged for a 7-day monitor that did not show any arrhythmia other than occasional PACs or PVCs.    Family history is pertinent for mother with atrial fibrillation, father with coronary artery disease in his 70s.     She had an echo done on February 19, 2020 showing normal LV function but stage I diastolic dysfunction and significantly enlarged left atrium.  Echo from 2023 however stated normal left atrial size.  I personally reviewed both echocardiograms and I believe that the left atrial size has been underestimated in the echo from 2023 the truth is in the middle her left atrium is moderately enlarged.     Holter monitor done on February 20, 2020 showed few SVTs during which she remained asymptomatic. There were 12 RUNS of SVT, longest in 4 beats at 146 bpm. Several PACs were noted, with 5% burden.      She also had a stress test on March 12, 2020 where she was able to exercise for 4-1/2 minutes achieving 7 metabolic equivalents with borderline EKG changes and shortness of  "breath.  We did another stress test with imaging which was normal.     Her EKG today in office shows sinus rhythm, No PACs noted this time .  EKG done in the OR on November 20, 2024 that revealed PACs in bigeminal pattern.    Last Recorded Vitals:  Vitals:    03/06/25 1122   BP: 122/70   BP Location: Left arm   Pulse: 53   Weight: 68.9 kg (152 lb)   Height: 1.651 m (5' 5\")       Past Medical History:  She has a past medical history of Abnormal result of other cardiovascular function study (04/02/2020), Alcoholism (Multi), Anxiety, Arthritis, Asthma, Conjunctivitis (10/2023), Dental disease, Diaphragmatic hernia without obstruction or gangrene (12/14/2017), Disorder of bone, unspecified (08/02/2021), DVT (deep venous thrombosis) (Multi), Encounter for other screening for malignant neoplasm of breast (11/03/2017), Gastro-esophageal reflux disease with esophagitis, without bleeding (11/08/2017), GERD (gastroesophageal reflux disease), History of transfusion, Hyperlipidemia, Hypertension, Long term (current) use of selective estrogen receptor modulators (serms) (11/03/2017), Motion sickness, Nipple discharge (05/04/2017), Nipple discharge (07/13/2017), Other chest pain (03/24/2020), Other specified personal risk factors, not elsewhere classified (12/16/2019), Overweight with body mass index (BMI) of 25 to 25.9 in adult (04/10/2023), Palpitations, Personal history of other diseases of the musculoskeletal system and connective tissue (06/06/2019), Personal history of other diseases of the respiratory system (12/07/2016), Personal history of other diseases of the respiratory system (12/07/2016), Personal history of other infectious and parasitic diseases (06/01/2020), Personal history of other specified conditions (12/07/2016), Personal history of other specified conditions (06/01/2020), Personal history of other specified conditions (05/04/2017), Personal history of other specified conditions, Type 2 diabetes mellitus, " Unspecified benign mammary dysplasia of unspecified breast (12/01/2021), Unspecified benign mammary dysplasia of unspecified breast (12/01/2021), Varicosities (7/24), and Vision loss.    Past Surgical History:  She has a past surgical history that includes Breast surgery (Left, 08/19/2017); Breast biopsy (Right, 05/04/2017); Hernia repair (Left, 05/04/2017); Other surgical history (05/04/2017); US chest ECHO (2023); XR BONE density (2023); Retinal detachment surgery (Left, 2024); and Vein Surgery (Right, 11/08/2024).      Social History:  She reports that she has never smoked. She has never used smokeless tobacco. She reports that she does not currently use alcohol. She reports that she does not use drugs.    Family History:  Family History   Problem Relation Name Age of Onset    Atrial fibrillation Mother Krupa Valencia         chronic    Other (cardiac pacemaker) Mother Krupa Valencia     Mental illness Mother Krupa Valencia     Coronary artery disease Father Mckinley Valencia     Alcohol abuse Father Mckinley Valencia     Ovarian cancer Mother's Sister      Pancreatic cancer Paternal Grandmother          Allergies:  Metformin    Outpatient Medications:  Current Outpatient Medications   Medication Instructions    albuterol 90 mcg/actuation inhaler 2 puffs, inhalation, Every 6 hours PRN    aspirin 81 mg capsule 1 tablet, Daily    atorvastatin (LIPITOR) 80 mg, oral, Daily    B complex-vitamin C-folic acid (Nephro-Pranay Rx) 1- mg-mg-mcg tablet 1 tablet, oral, Daily with breakfast    biotin 10,000 mcg capsule 1 capsule, oral, Once daily (morning) M-F (5 days a week)    cholecalciferol (VITAMIN D-3) 2,000 Units, oral, Daily    clobetasol (Temovate) 0.05 % ointment APPLY A THIN LAYER TO AFFECTED AREAS TWICE DAILY    empagliflozin (JARDIANCE) 25 mg, oral, Daily    glipiZIDE (GLUCOTROL) 10 mg, oral, 2 times daily    lisinopril 40 mg, oral, Daily    omeprazole (PRILOSEC) 20 mg, oral, Daily before breakfast, Do not crush or  chew.    sertraline (ZOLOFT) 50 mg, oral, Daily, as directed    Taltz Autoinjector 80 mg, subcutaneous, Every 28 days       Physical Exam:  Physical Exam  Vitals reviewed.   Constitutional:       Appearance: Normal appearance.   Neck:      Vascular: No carotid bruit or JVD.   Cardiovascular:      Rate and Rhythm: Normal rate and regular rhythm.      Pulses: Normal pulses.      Heart sounds: Normal heart sounds, S1 normal and S2 normal.   Pulmonary:      Effort: Pulmonary effort is normal. No respiratory distress.      Breath sounds: No wheezing or rales.   Abdominal:      General: Abdomen is flat.      Palpations: Abdomen is soft.   Musculoskeletal:      Right lower leg: No edema.      Left lower leg: No edema.   Skin:     General: Skin is warm.   Neurological:      Mental Status: She is alert and oriented to person, place, and time. Mental status is at baseline.   Psychiatric:         Mood and Affect: Mood normal.         Behavior: Behavior normal.           Last Labs:  CBC -  Lab Results   Component Value Date    WBC 5.2 10/31/2024    HGB 13.0 10/31/2024    HCT 40.0 10/31/2024    MCV 90 10/31/2024     10/31/2024       CMP -  Lab Results   Component Value Date    CALCIUM 9.4 10/31/2024    PROT 6.7 11/06/2023    ALBUMIN 4.0 11/06/2023    AST 18 11/06/2023    ALT 23 11/06/2023    ALKPHOS 43 11/06/2023    BILITOT 0.9 11/06/2023       LIPID PANEL -   Lab Results   Component Value Date    CHOL 123 11/06/2023    TRIG 55 11/06/2023    HDL 55.7 11/06/2023    CHHDL 2.2 11/06/2023    LDLF 62 12/12/2022    VLDL 11 11/06/2023    NHDL 67 11/06/2023       RENAL FUNCTION PANEL -   Lab Results   Component Value Date    GLUCOSE 71 (L) 10/31/2024     10/31/2024    K 3.9 10/31/2024     10/31/2024    CO2 25 10/31/2024    ANIONGAP 10 10/31/2024    BUN 14 10/31/2024    CREATININE 0.53 10/31/2024    CALCIUM 9.4 10/31/2024    ALBUMIN 4.0 11/06/2023        Lab Results   Component Value Date    HGBA1C 6.9 (A) 12/16/2024  "      Last Cardiology Tests:  ECG:  ECG 12 Lead 03/06/2025 (Preliminary)      Echo:  No results found for this or any previous visit from the past 1095 days.      Ejection Fractions:  No results found for: \"EF\"    Cath:  No results found for this or any previous visit from the past 1095 days.      Stress Test:  No results found for this or any previous visit from the past 1095 days.      Cardiac Imaging:  No results found for this or any previous visit from the past 1095 days.          Assessment/Plan   In summary Ms. Bravo is a 69-year-old lady with borderline abnormal stress test, history of palpitations and rapid heartbeat.     [id number] that her current symptoms are due to short lasting SVTs or PACs. However with enlarged left atrial size, HTN, DM, she remains at risk for atrial fibrillation. Fortunately she has a device that is technologically advanced enough to detect atrial fibrillation. I advised her to use her Apple watch if the fast heart beat lasts several minutes specially with rates above 120 bpm. I also advised her to notify us if this happens in future, we would be analyzing the data from her watch.  The repeat 24-hour monitor showed a slightly high burden of PACs from 2023 about 7% no significant PVCs and LV function was normal.     2-borderline abnormal stress test-repeat stress test was normal. All her symptoms have subsided.     3-primary prevention-due to history of diabetes I have taken the liberty of intensifying her statins.  Target blood pressure less than 130/80.  All her risk factors are well-controlled at present.         Comfort Yi MD  "

## 2025-03-07 LAB
ATRIAL RATE: 53 BPM
P AXIS: 29 DEGREES
P OFFSET: 193 MS
P ONSET: 144 MS
PR INTERVAL: 148 MS
Q ONSET: 218 MS
QRS COUNT: 9 BEATS
QRS DURATION: 84 MS
QT INTERVAL: 412 MS
QTC CALCULATION(BAZETT): 386 MS
QTC FREDERICIA: 395 MS
R AXIS: 36 DEGREES
T AXIS: 39 DEGREES
T OFFSET: 424 MS
VENTRICULAR RATE: 53 BPM

## 2025-04-16 DIAGNOSIS — F41.9 ANXIETY: ICD-10-CM

## 2025-04-16 DIAGNOSIS — I10 ESSENTIAL HYPERTENSION: ICD-10-CM

## 2025-04-16 DIAGNOSIS — F32.1 MODERATE MAJOR DEPRESSION (MULTI): ICD-10-CM

## 2025-04-17 RX ORDER — SERTRALINE HYDROCHLORIDE 50 MG/1
50 TABLET, FILM COATED ORAL DAILY
Qty: 90 TABLET | Refills: 1 | OUTPATIENT
Start: 2025-04-17

## 2025-04-17 RX ORDER — LISINOPRIL 40 MG/1
40 TABLET ORAL DAILY
Qty: 90 TABLET | Refills: 1 | OUTPATIENT
Start: 2025-04-17

## 2025-04-22 ENCOUNTER — TELEPHONE (OUTPATIENT)
Dept: PRIMARY CARE | Facility: CLINIC | Age: 70
End: 2025-04-22
Payer: MEDICARE

## 2025-04-22 DIAGNOSIS — F41.9 ANXIETY: ICD-10-CM

## 2025-04-22 DIAGNOSIS — F32.1 MODERATE MAJOR DEPRESSION (MULTI): ICD-10-CM

## 2025-04-22 DIAGNOSIS — I10 ESSENTIAL HYPERTENSION: ICD-10-CM

## 2025-04-22 RX ORDER — LISINOPRIL 40 MG/1
40 TABLET ORAL DAILY
Qty: 90 TABLET | Refills: 1 | Status: SHIPPED | OUTPATIENT
Start: 2025-04-22 | End: 2025-10-19

## 2025-04-22 RX ORDER — SERTRALINE HYDROCHLORIDE 50 MG/1
50 TABLET, FILM COATED ORAL DAILY
Qty: 90 TABLET | Refills: 1 | Status: SHIPPED | OUTPATIENT
Start: 2025-04-22 | End: 2025-10-19

## 2025-05-01 ENCOUNTER — APPOINTMENT (OUTPATIENT)
Dept: PRIMARY CARE | Facility: CLINIC | Age: 70
End: 2025-05-01
Payer: MEDICARE

## 2025-06-11 DIAGNOSIS — E78.2 MIXED HYPERLIPIDEMIA: ICD-10-CM

## 2025-06-11 DIAGNOSIS — K21.9 GASTROESOPHAGEAL REFLUX DISEASE WITHOUT ESOPHAGITIS: ICD-10-CM

## 2025-06-11 RX ORDER — OMEPRAZOLE 20 MG/1
20 CAPSULE, DELAYED RELEASE ORAL
Qty: 90 CAPSULE | Refills: 1 | OUTPATIENT
Start: 2025-06-11 | End: 2025-12-08

## 2025-06-11 RX ORDER — ATORVASTATIN CALCIUM 80 MG/1
80 TABLET, FILM COATED ORAL DAILY
Qty: 90 TABLET | Refills: 1 | OUTPATIENT
Start: 2025-06-11

## 2025-06-26 DIAGNOSIS — E78.2 MIXED HYPERLIPIDEMIA: ICD-10-CM

## 2025-06-26 RX ORDER — ATORVASTATIN CALCIUM 80 MG/1
80 TABLET, FILM COATED ORAL DAILY
Qty: 90 TABLET | Refills: 1 | Status: SHIPPED | OUTPATIENT
Start: 2025-06-26

## 2025-08-11 ENCOUNTER — APPOINTMENT (OUTPATIENT)
Dept: PRIMARY CARE | Facility: CLINIC | Age: 70
End: 2025-08-11
Payer: MEDICARE

## 2025-08-11 VITALS
RESPIRATION RATE: 16 BRPM | WEIGHT: 151 LBS | OXYGEN SATURATION: 97 % | HEIGHT: 65 IN | DIASTOLIC BLOOD PRESSURE: 73 MMHG | HEART RATE: 58 BPM | BODY MASS INDEX: 25.16 KG/M2 | SYSTOLIC BLOOD PRESSURE: 122 MMHG

## 2025-08-11 DIAGNOSIS — E11.29 MICROALBUMINURIA DUE TO TYPE 2 DIABETES MELLITUS (MULTI): ICD-10-CM

## 2025-08-11 DIAGNOSIS — Z11.59 NEED FOR HEPATITIS C SCREENING TEST: ICD-10-CM

## 2025-08-11 DIAGNOSIS — M85.80 OSTEOPENIA, UNSPECIFIED LOCATION: ICD-10-CM

## 2025-08-11 DIAGNOSIS — L30.9 DERMATITIS: ICD-10-CM

## 2025-08-11 DIAGNOSIS — I10 ESSENTIAL HYPERTENSION: ICD-10-CM

## 2025-08-11 DIAGNOSIS — F41.9 ANXIETY: ICD-10-CM

## 2025-08-11 DIAGNOSIS — F32.1 MODERATE MAJOR DEPRESSION (MULTI): ICD-10-CM

## 2025-08-11 DIAGNOSIS — Z00.00 MEDICARE ANNUAL WELLNESS VISIT, SUBSEQUENT: Primary | ICD-10-CM

## 2025-08-11 DIAGNOSIS — E78.2 MIXED HYPERLIPIDEMIA: ICD-10-CM

## 2025-08-11 DIAGNOSIS — L40.9 PSORIASIS: ICD-10-CM

## 2025-08-11 DIAGNOSIS — E11.65 TYPE 2 DIABETES MELLITUS WITH HYPERGLYCEMIA, WITHOUT LONG-TERM CURRENT USE OF INSULIN: ICD-10-CM

## 2025-08-11 DIAGNOSIS — L40.50 PSORIASIS WITH ARTHROPATHY (MULTI): ICD-10-CM

## 2025-08-11 DIAGNOSIS — E53.8 COBALAMIN DEFICIENCY: ICD-10-CM

## 2025-08-11 DIAGNOSIS — I49.1 ATRIAL PREMATURE DEPOLARIZATION: ICD-10-CM

## 2025-08-11 DIAGNOSIS — Z78.0 POSTMENOPAUSAL: ICD-10-CM

## 2025-08-11 DIAGNOSIS — K21.9 GASTROESOPHAGEAL REFLUX DISEASE WITHOUT ESOPHAGITIS: ICD-10-CM

## 2025-08-11 DIAGNOSIS — F10.21 ALCOHOL DEPENDENCE IN REMISSION (MULTI): ICD-10-CM

## 2025-08-11 DIAGNOSIS — E55.9 VITAMIN D DEFICIENCY: ICD-10-CM

## 2025-08-11 DIAGNOSIS — R80.9 MICROALBUMINURIA DUE TO TYPE 2 DIABETES MELLITUS (MULTI): ICD-10-CM

## 2025-08-11 PROBLEM — R55 PRE-SYNCOPE: Status: RESOLVED | Noted: 2023-02-08 | Resolved: 2025-08-11

## 2025-08-11 LAB — POC HEMOGLOBIN A1C: 6.7 % (ref 4.2–6.5)

## 2025-08-11 PROCEDURE — 3078F DIAST BP <80 MM HG: CPT | Performed by: FAMILY MEDICINE

## 2025-08-11 PROCEDURE — 99214 OFFICE O/P EST MOD 30 MIN: CPT | Performed by: FAMILY MEDICINE

## 2025-08-11 PROCEDURE — 4010F ACE/ARB THERAPY RXD/TAKEN: CPT | Performed by: FAMILY MEDICINE

## 2025-08-11 PROCEDURE — 1159F MED LIST DOCD IN RCRD: CPT | Performed by: FAMILY MEDICINE

## 2025-08-11 PROCEDURE — 1170F FXNL STATUS ASSESSED: CPT | Performed by: FAMILY MEDICINE

## 2025-08-11 PROCEDURE — 3044F HG A1C LEVEL LT 7.0%: CPT | Performed by: FAMILY MEDICINE

## 2025-08-11 PROCEDURE — 1036F TOBACCO NON-USER: CPT | Performed by: FAMILY MEDICINE

## 2025-08-11 PROCEDURE — 3008F BODY MASS INDEX DOCD: CPT | Performed by: FAMILY MEDICINE

## 2025-08-11 PROCEDURE — 3074F SYST BP LT 130 MM HG: CPT | Performed by: FAMILY MEDICINE

## 2025-08-11 PROCEDURE — G0439 PPPS, SUBSEQ VISIT: HCPCS | Performed by: FAMILY MEDICINE

## 2025-08-11 PROCEDURE — 83036 HEMOGLOBIN GLYCOSYLATED A1C: CPT | Performed by: FAMILY MEDICINE

## 2025-08-11 PROCEDURE — 1160F RVW MEDS BY RX/DR IN RCRD: CPT | Performed by: FAMILY MEDICINE

## 2025-08-11 RX ORDER — GUSELKUMAB 100 MG/ML
INJECTION SUBCUTANEOUS
COMMUNITY
Start: 2025-07-02

## 2025-08-11 RX ORDER — LISINOPRIL 40 MG/1
40 TABLET ORAL DAILY
Qty: 90 TABLET | Refills: 1 | Status: SHIPPED | OUTPATIENT
Start: 2025-08-11 | End: 2026-02-07

## 2025-08-11 RX ORDER — GLIPIZIDE 10 MG/1
10 TABLET ORAL 2 TIMES DAILY
Qty: 180 TABLET | Refills: 1 | Status: SHIPPED | OUTPATIENT
Start: 2025-08-11

## 2025-08-11 RX ORDER — ATORVASTATIN CALCIUM 80 MG/1
80 TABLET, FILM COATED ORAL DAILY
Qty: 90 TABLET | Refills: 1 | Status: SHIPPED | OUTPATIENT
Start: 2025-08-11

## 2025-08-11 RX ORDER — SERTRALINE HYDROCHLORIDE 50 MG/1
50 TABLET, FILM COATED ORAL DAILY
Qty: 90 TABLET | Refills: 1 | Status: SHIPPED | OUTPATIENT
Start: 2025-08-11 | End: 2026-02-07

## 2025-08-11 RX ORDER — PREDNISONE 10 MG/1
TABLET ORAL
Qty: 45 TABLET | Refills: 0 | Status: SHIPPED | OUTPATIENT
Start: 2025-08-11 | End: 2025-08-26

## 2025-08-11 ASSESSMENT — ENCOUNTER SYMPTOMS
DYSURIA: 0
DIZZINESS: 0
POLYDIPSIA: 0
VOMITING: 0
ABDOMINAL PAIN: 0
POLYPHAGIA: 0
SINUS PAIN: 0
HEMATURIA: 0
DIAPHORESIS: 0
UNEXPECTED WEIGHT CHANGE: 0
CHILLS: 0
PALPITATIONS: 0
WHEEZING: 0
NUMBNESS: 0
LIGHT-HEADEDNESS: 0
FEVER: 0
CHEST TIGHTNESS: 0
FREQUENCY: 0
SHORTNESS OF BREATH: 0
SORE THROAT: 0
NERVOUS/ANXIOUS: 0
HEADACHES: 0
CONSTIPATION: 0
CONFUSION: 0
ADENOPATHY: 0
NAUSEA: 0
DIARRHEA: 0
DYSPHORIC MOOD: 0
COUGH: 0
SINUS PRESSURE: 0

## 2025-08-11 ASSESSMENT — ACTIVITIES OF DAILY LIVING (ADL)
BATHING: INDEPENDENT
DRESSING: INDEPENDENT
GROCERY_SHOPPING: INDEPENDENT
MANAGING_FINANCES: INDEPENDENT
DOING_HOUSEWORK: INDEPENDENT
TAKING_MEDICATION: INDEPENDENT

## 2025-08-13 ENCOUNTER — APPOINTMENT (OUTPATIENT)
Dept: RADIOLOGY | Facility: HOSPITAL | Age: 70
End: 2025-08-13
Payer: MEDICARE

## 2025-08-13 VITALS — BODY MASS INDEX: 25.16 KG/M2 | HEIGHT: 65 IN | WEIGHT: 151 LBS

## 2025-08-13 DIAGNOSIS — Z12.31 VISIT FOR SCREENING MAMMOGRAM: ICD-10-CM

## 2025-08-13 PROCEDURE — 77063 BREAST TOMOSYNTHESIS BI: CPT

## 2025-08-13 PROCEDURE — 77067 SCR MAMMO BI INCL CAD: CPT

## 2026-02-11 ENCOUNTER — APPOINTMENT (OUTPATIENT)
Dept: PRIMARY CARE | Facility: CLINIC | Age: 71
End: 2026-02-11
Payer: MEDICARE

## 2026-08-12 ENCOUNTER — APPOINTMENT (OUTPATIENT)
Dept: PRIMARY CARE | Facility: CLINIC | Age: 71
End: 2026-08-12
Payer: MEDICARE

## (undated) DEVICE — SUTURE, SILK, 2-0, 18IN, BR, FS, BLACK

## (undated) DEVICE — Device

## (undated) DEVICE — SUTURE, SILK, 3-0, 18 IN, MULTIPACK, BLACK

## (undated) DEVICE — DRESSING, TRANSPARENT, TEGADERM, 4 X 4-3/4 IN

## (undated) DEVICE — SUTURE, SILK, 4-0, 18 IN, LABYRINTH, BLACK

## (undated) DEVICE — CAUTERY, PENCIL, PUSH BUTTON, SMOKE EVAC, 70MM

## (undated) DEVICE — SOLUTION, IRRIGATION, SODIUM CHLORIDE 0.9%, 1000 ML, POUR BOTTLE

## (undated) DEVICE — APPLIER, LIGACLIP, MULTIPLE, SMALL 9-3/8IN

## (undated) DEVICE — SUTURE, SILK, 2-0, 18 IN, BLACK

## (undated) DEVICE — COVER HANDLE LIGHT, STERIS, BLUE, STERILE

## (undated) DEVICE — SPONGE, GAUZE, XRAY DECT, 16 PLY, 4 X 4, W/MASTER WDMT,STERILE

## (undated) DEVICE — SUTURE, MONOCRYL, 4-0, 18 IN, PS2, UNDYED

## (undated) DEVICE — DRESSING, GAUZE, SPONGE, VERSALON, ALL PURPOSE, 4 X 4 IN, SOFT

## (undated) DEVICE — BANDAGE, COMPRESSION, EZE-BAND, DBL, 6 X 11 YDS

## (undated) DEVICE — GLOVE, PROTEXIS PI CLASSIC, SZ-7.0, PF, LF

## (undated) DEVICE — STRIP, SKIN CLOSURE, STERI STRIP, REINFORCED, 0.5 X 4 IN

## (undated) DEVICE — APPLICATOR, CHLORAPREP, W/ORANGE TINT, 26ML

## (undated) DEVICE — TOWEL PACK, STERILE, 4/PACK, BLUE

## (undated) DEVICE — BANDAGE, GAUZE, CONFORMING, KERLIX, 6 PLY, 4.5 IN X 4.1 YD

## (undated) DEVICE — SUTURE, MONOCRYL, 3-0, PS-1 27IN, UNDYED